# Patient Record
Sex: MALE | Race: WHITE | NOT HISPANIC OR LATINO | Employment: FULL TIME | URBAN - METROPOLITAN AREA
[De-identification: names, ages, dates, MRNs, and addresses within clinical notes are randomized per-mention and may not be internally consistent; named-entity substitution may affect disease eponyms.]

---

## 2017-06-30 ENCOUNTER — HOSPITAL ENCOUNTER (OUTPATIENT)
Dept: LAB | Facility: MEDICAL CENTER | Age: 42
End: 2017-06-30
Attending: FAMILY MEDICINE
Payer: COMMERCIAL

## 2017-06-30 LAB
ALBUMIN SERPL BCP-MCNC: 4.1 G/DL (ref 3.2–4.9)
ALBUMIN/GLOB SERPL: 1.4 G/DL
ALP SERPL-CCNC: 50 U/L (ref 30–99)
ALT SERPL-CCNC: 52 U/L (ref 2–50)
ANION GAP SERPL CALC-SCNC: 8 MMOL/L (ref 0–11.9)
AST SERPL-CCNC: 34 U/L (ref 12–45)
BASOPHILS # BLD AUTO: 0.5 % (ref 0–1.8)
BASOPHILS # BLD: 0.03 K/UL (ref 0–0.12)
BILIRUB SERPL-MCNC: 0.8 MG/DL (ref 0.1–1.5)
BUN SERPL-MCNC: 15 MG/DL (ref 8–22)
CALCIUM SERPL-MCNC: 9.1 MG/DL (ref 8.5–10.5)
CHLORIDE SERPL-SCNC: 108 MMOL/L (ref 96–112)
CHOLEST SERPL-MCNC: 232 MG/DL (ref 100–199)
CO2 SERPL-SCNC: 24 MMOL/L (ref 20–33)
CREAT SERPL-MCNC: 1.1 MG/DL (ref 0.5–1.4)
EOSINOPHIL # BLD AUTO: 0.15 K/UL (ref 0–0.51)
EOSINOPHIL NFR BLD: 2.5 % (ref 0–6.9)
ERYTHROCYTE [DISTWIDTH] IN BLOOD BY AUTOMATED COUNT: 41.3 FL (ref 35.9–50)
GFR SERPL CREATININE-BSD FRML MDRD: >60 ML/MIN/1.73 M 2
GLOBULIN SER CALC-MCNC: 3 G/DL (ref 1.9–3.5)
GLUCOSE SERPL-MCNC: 90 MG/DL (ref 65–99)
HCT VFR BLD AUTO: 51.4 % (ref 42–52)
HDLC SERPL-MCNC: 55 MG/DL
HGB BLD-MCNC: 17.4 G/DL (ref 14–18)
IMM GRANULOCYTES # BLD AUTO: 0.01 K/UL (ref 0–0.11)
IMM GRANULOCYTES NFR BLD AUTO: 0.2 % (ref 0–0.9)
LDLC SERPL CALC-MCNC: 144 MG/DL
LYMPHOCYTES # BLD AUTO: 1.71 K/UL (ref 1–4.8)
LYMPHOCYTES NFR BLD: 28.4 % (ref 22–41)
MCH RBC QN AUTO: 30.6 PG (ref 27–33)
MCHC RBC AUTO-ENTMCNC: 33.9 G/DL (ref 33.7–35.3)
MCV RBC AUTO: 90.5 FL (ref 81.4–97.8)
MONOCYTES # BLD AUTO: 0.66 K/UL (ref 0–0.85)
MONOCYTES NFR BLD AUTO: 11 % (ref 0–13.4)
NEUTROPHILS # BLD AUTO: 3.46 K/UL (ref 1.82–7.42)
NEUTROPHILS NFR BLD: 57.4 % (ref 44–72)
NRBC # BLD AUTO: 0 K/UL
NRBC BLD AUTO-RTO: 0 /100 WBC
PLATELET # BLD AUTO: 221 K/UL (ref 164–446)
PMV BLD AUTO: 11.6 FL (ref 9–12.9)
POTASSIUM SERPL-SCNC: 4 MMOL/L (ref 3.6–5.5)
PROT SERPL-MCNC: 7.1 G/DL (ref 6–8.2)
RBC # BLD AUTO: 5.68 M/UL (ref 4.7–6.1)
SODIUM SERPL-SCNC: 140 MMOL/L (ref 135–145)
TRIGL SERPL-MCNC: 166 MG/DL (ref 0–149)
TSH SERPL DL<=0.005 MIU/L-ACNC: 2.61 UIU/ML (ref 0.3–3.7)
WBC # BLD AUTO: 6 K/UL (ref 4.8–10.8)

## 2017-06-30 PROCEDURE — 80053 COMPREHEN METABOLIC PANEL: CPT

## 2017-06-30 PROCEDURE — 84443 ASSAY THYROID STIM HORMONE: CPT

## 2017-06-30 PROCEDURE — 80061 LIPID PANEL: CPT

## 2017-06-30 PROCEDURE — 36415 COLL VENOUS BLD VENIPUNCTURE: CPT

## 2017-06-30 PROCEDURE — 85025 COMPLETE CBC W/AUTO DIFF WBC: CPT

## 2017-07-10 ENCOUNTER — OCCUPATIONAL MEDICINE (OUTPATIENT)
Dept: URGENT CARE | Facility: PHYSICIAN GROUP | Age: 42
End: 2017-07-10
Payer: COMMERCIAL

## 2017-07-10 VITALS
RESPIRATION RATE: 16 BRPM | HEIGHT: 75 IN | SYSTOLIC BLOOD PRESSURE: 118 MMHG | BODY MASS INDEX: 32.95 KG/M2 | OXYGEN SATURATION: 96 % | HEART RATE: 82 BPM | TEMPERATURE: 97.3 F | WEIGHT: 265 LBS | DIASTOLIC BLOOD PRESSURE: 86 MMHG

## 2017-07-10 DIAGNOSIS — S71.111A LACERATION OF RIGHT THIGH, INITIAL ENCOUNTER: Primary | ICD-10-CM

## 2017-07-10 DIAGNOSIS — Z02.1 PRE-EMPLOYMENT DRUG SCREENING: ICD-10-CM
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
## 2017-07-10 LAB
AMP AMPHETAMINE: NORMAL
BREATH ALCOHOL COMMENT: NORMAL
COC COCAINE: NORMAL
INT CON NEG: NEGATIVE
INT CON POS: POSITIVE
MET METHAMPHETAMINES: NORMAL
OPI OPIATES: NORMAL
PCP PHENCYCLIDINE: NORMAL
POC BREATHALIZER: 0 PERCENT (ref 0–0.01)
POC DRUG COMMENT 753798-OCCUPATIONAL HEALTH: NORMAL
THC: NORMAL

## 2017-07-10 PROCEDURE — 12002 RPR S/N/AX/GEN/TRNK2.6-7.5CM: CPT | Mod: 29 | Performed by: PHYSICIAN ASSISTANT

## 2017-07-10 PROCEDURE — 80305 DRUG TEST PRSMV DIR OPT OBS: CPT | Mod: 29 | Performed by: PHYSICIAN ASSISTANT

## 2017-07-10 PROCEDURE — 90471 IMMUNIZATION ADMIN: CPT | Mod: 29 | Performed by: PHYSICIAN ASSISTANT

## 2017-07-10 PROCEDURE — 90715 TDAP VACCINE 7 YRS/> IM: CPT | Mod: 29 | Performed by: PHYSICIAN ASSISTANT

## 2017-07-10 PROCEDURE — 82075 ASSAY OF BREATH ETHANOL: CPT | Performed by: PHYSICIAN ASSISTANT

## 2017-07-10 RX ORDER — TRAMADOL HYDROCHLORIDE 50 MG/1
50 TABLET ORAL EVERY 4 HOURS PRN
Qty: 30 TAB | Refills: 0 | Status: SHIPPED | OUTPATIENT
Start: 2017-07-10 | End: 2018-02-23

## 2017-07-10 RX ORDER — CEPHALEXIN 500 MG/1
500 CAPSULE ORAL 4 TIMES DAILY
Qty: 20 CAP | Refills: 0 | Status: SHIPPED | OUTPATIENT
Start: 2017-07-10 | End: 2017-07-15

## 2017-07-10 ASSESSMENT — PAIN SCALES - GENERAL: PAINLEVEL: NO PAIN

## 2017-07-10 NOTE — Clinical Note
[facility]   [address] - [place] [address]  Phone: [phone] - Fax: [phone]        [facility] Progress Report and Disability Certification  Date of Service: 7/10/2017   No Show:  No  Date / Time of Next Visit: 7/20/2017   Claim Information   Patient Name: Deejay Pham  Claim Number:     Employer: [employer]  Date of Injury:   07/10/2017   Insurer / TPA: [employer] Claims ID / SSN:     Occupation:   II Diagnosis: The primary encounter diagnosis was Laceration of right thigh, initial encounter. A diagnosis of Work related injury was also pertinent to this visit.    Medical Information   Related to Industrial Injury? Yes    Subjective Complaints:  DOI: 7/10/17  Pt notes he was cutting zip ties at work with a dirty utility knife and it slipped and cut into his right thigh. Pt notes tetanus is out of date. Pt denies second job. Pt notes only minor blood loss and wound dressed at work. Pt has not taken any Rx medications for this condition. Pt states the pain is a 7/10, aching in nature and worse right now. Pt denies CP, SOB, NVD, paresthesias, headaches, dizziness, change in vision, hives, or other joint pain. The pt's medication list, problem list, and allergies have been evaluated and reviewed during today's visit.     Objective Findings: Right thigh: Upon inspection, no ecchymoses, no edema, no erythema. +TTP about 3 cm laceration with mild sanguinous drainage, +AROM, +SILT, STR 5/5, DP 2+ B/L      Pre-Existing Condition(s):     Assessment:   Initial Visit    Status: Additional Care Required  Permanent Disability:No    Plan: MedicationMedication (NOT at Work)  Comments:OTC ibuprofen and RX keflex and RX ultram (not at work)    Diagnostics:   Comments:n/a    Comments:       Disability Information   Status: Released to Restricted Duty    From:  7/10/2017  Through: 7/20/2017 Restrictions  are: Temporary   Physical Restrictions   Sitting:    Standing:    Stooping:    Bending:      Squattin hrs/day Walking:    Climbin hrs/day Pushing:      Pulling:    Other:    Reaching Above Shoulder (L):   Reaching Above Shoulder (R):       Reaching Below Shoulder (L):    Reaching Below Shoulder (R):      Not to exceed Weight Limits   Carrying(hrs):   Weight Limit(lb): < or = to 25 pounds Lifting(hrs):   Weight  Limit(lb): < or = to 25 pounds   Comments:      Repetitive Actions   Hands: i.e. Fine Manipulations from Grasping:     Feet: i.e. Operating Foot Controls:     Driving / Operate Machinery:     Physician Name: Luis Lima PA-C Physician Signature: LUIS Borges PA-C e-Signature: Dr. Bertram Bahena, Medical Director   Clinic Name / Location: [address]  [place] [address] Clinic Phone Number: Dept: [phone]   Appointment Time: 12:55 Pm Visit Start Time: 1:24 PM   Check-In Time:  12:59 Pm Visit Discharge Time:  2:20PM   Original-Treating Physician or Chiropractor    Page 2-Insurer/TPA    Page 3-Employer    Page 4-Employee

## 2017-07-10 NOTE — PATIENT INSTRUCTIONS
Laceration Care, Adult  A laceration is a cut that goes through all of the layers of the skin and into the tissue that is right under the skin. Some lacerations heal on their own. Others need to be closed with stitches (sutures), staples, skin adhesive strips, or skin glue. Proper laceration care minimizes the risk of infection and helps the laceration to heal better.  HOW TO CARE FOR YOUR LACERATION  If sutures or staples were used:  · Keep the wound clean and dry.  · If you were given a bandage (dressing), you should change it at least one time per day or as told by your health care provider. You should also change it if it becomes wet or dirty.  · Keep the wound completely dry for the first 24 hours or as told by your health care provider. After that time, you may shower or bathe. However, make sure that the wound is not soaked in water until after the sutures or staples have been removed.  · Clean the wound one time each day or as told by your health care provider:  ¨ Wash the wound with soap and water.  ¨ Rinse the wound with water to remove all soap.  ¨ Pat the wound dry with a clean towel. Do not rub the wound.  · After cleaning the wound, apply a thin layer of antibiotic ointment as told by your health care provider. This will help to prevent infection and keep the dressing from sticking to the wound.  · Have the sutures or staples removed as told by your health care provider.  If skin adhesive strips were used:  · Keep the wound clean and dry.  · If you were given a bandage (dressing), you should change it at least one time per day or as told by your health care provider. You should also change it if it becomes dirty or wet.  · Do not get the skin adhesive strips wet. You may shower or bathe, but be careful to keep the wound dry.  · If the wound gets wet, pat it dry with a clean towel. Do not rub the wound.  · Skin adhesive strips fall off on their own. You may trim the strips as the wound heals. Do not  remove skin adhesive strips that are still stuck to the wound. They will fall off in time.  If skin glue was used:  · Try to keep the wound dry, but you may briefly wet it in the shower or bath. Do not soak the wound in water, such as by swimming.  · After you have showered or bathed, gently pat the wound dry with a clean towel. Do not rub the wound.  · Do not do any activities that will make you sweat heavily until the skin glue has fallen off on its own.  · Do not apply liquid, cream, or ointment medicine to the wound while the skin glue is in place. Using those may loosen the film before the wound has healed.  · If you were given a bandage (dressing), you should change it at least one time per day or as told by your health care provider. You should also change it if it becomes dirty or wet.  · If a dressing is placed over the wound, be careful not to apply tape directly over the skin glue. Doing that may cause the glue to be pulled off before the wound has healed.  · Do not pick at the glue. The skin glue usually remains in place for 5-10 days, then it falls off of the skin.  General Instructions  · Take over-the-counter and prescription medicines only as told by your health care provider.  · If you were prescribed an antibiotic medicine or ointment, take or apply it as told by your doctor. Do not stop using it even if your condition improves.  · To help prevent scarring, make sure to cover your wound with sunscreen whenever you are outside after stitches are removed, after adhesive strips are removed, or when glue remains in place and the wound is healed. Make sure to wear a sunscreen of at least 30 SPF.  · Do not scratch or pick at the wound.  · Keep all follow-up visits as told by your health care provider. This is important.  · Check your wound every day for signs of infection. Watch for:  ¨ Redness, swelling, or pain.  ¨ Fluid, blood, or pus.  · Raise (elevate) the injured area above the level of your heart  while you are sitting or lying down, if possible.  SEEK MEDICAL CARE IF:  · You received a tetanus shot and you have swelling, severe pain, redness, or bleeding at the injection site.  · You have a fever.  · A wound that was closed breaks open.  · You notice a bad smell coming from your wound or your dressing.  · You notice something coming out of the wound, such as wood or glass.  · Your pain is not controlled with medicine.  · You have increased redness, swelling, or pain at the site of your wound.  · You have fluid, blood, or pus coming from your wound.  · You notice a change in the color of your skin near your wound.  · You need to change the dressing frequently due to fluid, blood, or pus draining from the wound.  · You develop a new rash.  · You develop numbness around the wound.  SEEK IMMEDIATE MEDICAL CARE IF:  · You develop severe swelling around the wound.  · Your pain suddenly increases and is severe.  · You develop painful lumps near the wound or on skin that is anywhere on your body.  · You have a red streak going away from your wound.  · The wound is on your hand or foot and you cannot properly move a finger or toe.  · The wound is on your hand or foot and you notice that your fingers or toes look pale or bluish.     This information is not intended to replace advice given to you by your health care provider. Make sure you discuss any questions you have with your health care provider.     Document Released: 12/18/2006 Document Revised: 05/03/2016 Document Reviewed: 12/14/2015  That{img} Interactive Patient Education ©2016 [employer]

## 2017-07-10 NOTE — PROGRESS NOTES
Subjective:      Pt is a 41 y.o. male who presents with Leg Laceration      DOI: 7/10/17  Pt notes he was cutting zip ties at work with a dirty utility knife and it slipped and cut into his right thigh. Pt notes tetanus is out of date. Pt denies second job. Pt notes only minor blood loss and wound dressed at work. Pt has not taken any Rx medications for this condition. Pt states the pain is a 7/10, aching in nature and worse right now. Pt denies CP, SOB, NVD, paresthesias, headaches, dizziness, change in vision, hives, or other joint pain. The pt's medication list, problem list, and allergies have been evaluated and reviewed during today's visit.       HPI  PMH:  Past Medical History   Diagnosis Date   • Obesity, unspecified    • Hypertension        PSH:  Past Surgical History   Procedure Laterality Date   • Appendectomy     • Fasciotomy Right 2016     Procedure: FASCIOTOMY FOOT;  Surgeon: Spencer Brandt D.P.M.;  Location: SURGERY SAME DAY [facility];  Service:        [facility] Hx:    family history includes Cancer in his maternal grandfather, maternal grandmother, and mother; Hyperlipidemia in his father; Hypertension in his father.  Family Status   Relation Status Death Age   • Mother     • Father Alive    • Sister Alive    • Brother Alive    • Maternal Grandmother     • Maternal Grandfather     • Paternal Grandmother     • Paternal Grandfather         Soc HX:  Social History     Social History   • Marital Status:      Spouse Name: N/A   • Number of Children: N/A   • Years of Education: N/A     Occupational History   • Not on file.     Social History Main Topics   • Smoking status: Never Smoker    • Smokeless tobacco: Current User     Types: Chew   • Alcohol Use: 3.0 oz/week     6 Cans of beer per week      Comment: 7/week   • Drug Use: No   • Sexual Activity:     Partners: Female     Other Topics Concern   • Not on file     Social History Narrative  "        Medications:    Current outpatient prescriptions:   •  tramadol (ULTRAM) 50 MG Tab, Take 1 Tab by mouth every four hours as needed., Disp: 30 Tab, Rfl: 0  •  cephALEXin (KEFLEX) 500 MG Cap, Take 1 Cap by mouth 4 times a day for 5 days., Disp: 20 Cap, Rfl: 0  •  lisinopril (PRINIVIL) 20 MG Tab, Take 20 mg by mouth every day., Disp: , Rfl:   •  vitamin D (CHOLECALCIFEROL) 1000 UNIT Tab, Take 1,000 Units by mouth every day., Disp: , Rfl:       Allergies:  Review of patient's allergies indicates no known allergies.      ROS  Constitutional: Negative for fever, chills and malaise/fatigue.   HENT: Negative for congestion and sore throat.    Eyes: Negative for blurred vision, double vision and photophobia.   Respiratory: Negative for cough and shortness of breath.    Cardiovascular: Negative for chest pain and palpitations.   Gastrointestinal: Negative for heartburn, nausea, vomiting, abdominal pain, diarrhea and constipation.   Genitourinary: Negative for dysuria and flank pain.   Musculoskeletal: Negative for joint pain and myalgias.   Skin: POS for right thigh laceration  Neurological: Negative for dizziness, tingling and headaches.   Endo/Heme/Allergies: Does not bruise/bleed easily.   Psychiatric/Behavioral: Negative for depression. The patient is not nervous/anxious.           Objective:     /86 mmHg  Pulse 82  Temp(Src) 36.3 °C (97.3 °F)  Resp 16  Ht 1.905 m (6' 3\")  Wt 120.203 kg (265 lb)  BMI 33.12 kg/m2  SpO2 96%     Physical Exam    Right thigh: Upon inspection, no ecchymoses, no edema, no erythema. +TTP about 3 cm laceration with mild sanguinous drainage, +AROM, +SILT, STR 5/5, DP 2+ B/L     Constitutional: PT is oriented to person, place, and time. PT appears well-developed and well-nourished. No distress.   HENT:   Head: Normocephalic and atraumatic.   Mouth/Throat: Oropharynx is clear and moist. No oropharyngeal exudate.   Eyes: Conjunctivae normal and EOM are normal. Pupils are equal, " round, and reactive to light.   Neck: Normal range of motion. Neck supple. No thyromegaly present.   Cardiovascular: Normal rate, regular rhythm, normal heart sounds and intact distal pulses.  Exam reveals no gallop and no friction rub.    No murmur heard.  Pulmonary/Chest: Effort normal and breath sounds normal. No respiratory distress. PT has no wheezes. PT has no rales. Pt exhibits no tenderness.   Abdominal: Soft. Bowel sounds are normal. PT exhibits no distension and no mass. There is no tenderness. There is no rebound and no guarding.   Musculoskeletal: Normal range of motion. PT exhibits no edema and no tenderness.   Neurological: PT is alert and oriented to person, place, and time. PT has normal reflexes. No cranial nerve deficit.       Psychiatric: PT has a normal mood and affect. PT behavior is normal. Judgment and thought content normal.        Assessment/Plan:     1. Laceration of right thigh, initial encounter    - TDAP VACCINE =>6YO IM  - tramadol (ULTRAM) 50 MG Tab; Take 1 Tab by mouth every four hours as needed.  Dispense: 30 Tab; Refill: 0  - cephALEXin (KEFLEX) 500 MG Cap; Take 1 Cap by mouth 4 times a day for 5 days.  Dispense: 20 Cap; Refill: 0    2. Work related injury    - TDAP VACCINE =>6YO IM      Procedure: Laceration Repair  -Risks including bleeding, nerve damage, infection, and poor cosmetic outcome discussed at length. Benefits and alternatives discussed.   -Sterile technique throughout  -Local anesthesia with 2% lidocaine plain  -Closed with #3  4-0 Nylon interrupted sutures with good wound approximation  -Polysporin and dressing placed  -Patient tolerated well    Tetanus updated in clinic today  [place]  Aware web site evaluation: I have obtained and reviewed patient utilization report from [facility] pharmacy database prior to writing prescription for controlled substance II, III or IV per [place] bill . Based on the report and my clinical assessment the prescription is  medically necessary.   NSAIDs for pain 1-5, Ultram for pain 6-10 or to help get to sleep.  RICE therapy discussed  Gentle ROM exercises discussed  WBAT right LE  Ice/heat therapy discussed  OTC ibuprofen and RX ultram for pain control  Keflex for abx coverage  Rest, fluids encouraged.  AVS with medical info given.  Pt was in full understanding and agreement with the plan.  Follow-up 10 days for suture removal and wound check

## 2017-07-10 NOTE — Clinical Note
"EMPLOYEE’S CLAIM FOR COMPENSATION/ REPORT OF INITIAL TREATMENT  FORM C-4    EMPLOYEE’S CLAIM - PROVIDE ALL INFORMATION REQUESTED   First Name  Deejay Last Name  Vero Birthdate                    1975                Sex  male Claim Number   Home Address  [address] Age  41 y.o. 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 (if applicable)      How did this injury or occupational disease occur? (Be specific an answer in detail. Use additional sheet if necessary)     If you believe that you have an occupational disease, when did you first have knowledge of the disability and it relationship to your employment?   Witnesses to the Accident        Nature of Injury or Occupational Disease    Part(s) of Body Injured or Affected  , ,     I certify that the above is true and correct to the best of my knowledge and that I have provided this information in order to obtain the benefits of [place]  I hereby authorize any physician, " chiropractor, surgeon, practitioner, or other person, any hospital, including [facility] or [facility], any medical service organization, any insurance company, or other institution or organization to release to each other, any medical or other information, including benefits paid or payable, pertinent to this injury or disease, except information relative to diagnosis, treatment and/or counseling for AIDS, psychological conditions, alcohol or controlled substances, for which I must give specific authorization.  A Photostat of this authorization shall be as valid as the original.     Date   Place   Employee’s Signature   THIS REPORT MUST BE COMPLETED AND MAILED WITHIN 3 WORKING DAYS OF TREATMENT   Place  [facility]  Name of Facility  [place]   Date  7/10/2017 Diagnosis  (S71.111A) Laceration of right thigh, initial encounter  (primary encounter diagnosis)  (Y99.0) Work related injury Is there evidence the injured employee was under the influence of alcohol and/or another controlled substance at the time of accident?   Hour  1:24 PM Description of Injury or Disease  The primary encounter diagnosis was Laceration of right thigh, initial encounter. A diagnosis of Work related injury was also pertinent to this visit. No   Treatment  Rest, ice, wound cleansed and sutured with 3 simple interrupted sutures, OTC ibuprofen and RX ultram for pain with Keflex antibiotic coverage from using unclean knife which caused the injury.  Have you advised the patient to remain off work five days or more? No   X-Ray Findings    Comments:n/a   If Yes   From Date  To Date      From information given by the employee, together with medical evidence, can you directly connect this injury or occupational disease as job incurred?  Yes If No Full Duty  No Modified Duty  Yes   Is additional medical care by a physician indicated?  Yes If Modified Duty, Specify any Limitations / Restrictions  SEE  "RESTRICTIONS   Do you know of any previous injury or disease contributing to this condition or occupational disease?                            No   Date  7/10/2017 Print Doctor’s Name Luis Lima PA-C I certify the employer’s copy of  this form was mailed on:   Address  [address] [place] [address]. [address] Insurer’s Use Only     [facility] Zip  [address]    Provider’s Tax ID Number  [id number]  Telephone  Dept: [phone]        e-SignJOSELUIS VILLA PA-C   e-Signature: Dr. Bertram Bahena, Medical Director Degree  SANJUANITA        ORIGINAL-TREATING PHYSICIAN OR CHIROPRACTOR    PAGE 2-INSURER/TPA    PAGE 3-EMPLOYER    PAGE 4-EMPLOYEE             Form C-4 (rev10/07)              BRIEF DESCRIPTION OF RIGHTS AND BENEFITS  (Pursuant to [place] 616[place])    Notice of Injury or Occupational Disease (Incident Report Form C-1): If an injury or occupational disease (OD) arises out of and in the  course of employment, you must provide written notice to your employer as soon as practicable, but no later than 7 days after the accident or  OD. Your employer shall maintain a sufficient supply of the required forms.    Claim for Compensation (Form C-4): If medical treatment is sought, the form C-4 is available at the place of initial treatment. A completed  \"Claim for Compensation\" (Form C-4) must be filed within 90 days after an accident or OD. The treating physician or chiropractor must,  within 3 working days after treatment, complete and mail to the employer, the employer's insurer and third-party , the Claim for  Compensation.    Medical Treatment: If you require medical treatment for your on-the-job injury or OD, you may be required to select a physician or  chiropractor from a list provided by your workers’ compensation insurer, if it has contracted with an Organization for Managed Care (MCO) or  Preferred Provider Organization (PPO) or providers of health care. If your employer has not entered into a " contract with an MCO or PPO, you  may select a physician or chiropractor from the Panel of Physicians and Chiropractors. Any medical costs related to your industrial injury or  OD will be paid by your insurer.    Temporary Total Disability (TTD): If your doctor has certified that you are unable to work for a period of at least 5 consecutive days, or 5  cumulative days in a 20-day period, or places restrictions on you that your employer does not accommodate, you may be entitled to TTD  compensation.    Temporary Partial Disability (TPD): If the wage you receive upon reemployment is less than the compensation for TTD to which you are  entitled, the insurer may be required to pay you TPD compensation to make up the difference. TPD can only be paid for a maximum of 24  months.    Permanent Partial Disability (PPD): When your medical condition is stable and there is an indication of a PPD as a result of your injury or  OD, within 30 days, your insurer must arrange for an evaluation by a rating physician or chiropractor to determine the degree of your PPD. The  amount of your PPD award depends on the date of injury, the results of the PPD evaluation and your age and wage.    Permanent Total Disability (PTD): If you are medically certified by a treating physician or chiropractor as permanently and totally disabled  and have been granted a PTD status by your insurer, you are entitled to receive monthly benefits not to exceed 66 2/3% of your average  monthly wage. The amount of your PTD payments is subject to reduction if you previously received a PPD award.    Vocational Rehabilitation Services: You may be eligible for vocational rehabilitation services if you are unable to return to the job due to a  permanent physical impairment or permanent restrictions as a result of your injury or occupational disease.    Transportation and Per Alexei Reimbursement: You may be eligible for travel expenses and per alexei associated with  medical treatment.    Reopening: You may be able to reopen your claim if your condition worsens after claim closure.    Appeal Process: If you disagree with a written determination issued by the insurer or the insurer does not respond to your request, you may  appeal to the Department of Administration, , by following the instructions contained in your determination letter. You must  appeal the determination within 70 days from the date of the determination letter at [address], [address], [place], [place]  [address], or [address], [place], [place] [address]. If you disagree with the  decision, you may appeal to the  Department of Administration, . You must file your appeal within 30 days from the date of the  decision  letter at [address], [address], [place], [place] [address], or [address], [place], [place] [address]. If you  disagree with a decision of an , you may file a petition for judicial review with the District Court. You must do so within 30  days of the Appeal Officer’s decision. You may be represented by an  at your own expense or you may contact the [facility] for possible  representation.    [place]  for Injured Workers (NAIW): If you disagree with a  decision, you may request that NAIW represent you  without charge at an  Hearing. For information regarding denial of benefits, you may contact the [facility] at: [address], [address], [place] [address], [phone], or [address], [address], [place] [address], [phone]    To File a Complaint with the Division: If you wish to file a complaint with the  of the Division of Industrial Relations (DIR),  please contact the Workers’ Compensation Section, [address], [address], [place], [place] [address], telephone [phone]) [phone],  or  [address], [address], [place], [place] [address], telephone [phone].    For assistance with Workers’ Compensation Issues: you may contact the Office of the Governor Consumer Health Assistance, [address] [place] [address], [address], [place], [place] [address], Toll Free 1-883.245.2069, Web site: [email or web address].[email or web address]., E-mail  [email or web address].[email or web address].                                                                                                                                                                                                                                   __________________________________________________________________                                                                   _________________                Employee Name / Signature                                                                                                                                                       Date                                                                                                                                                                                                     D-2 (rev. 10/07)

## 2017-07-10 NOTE — MR AVS SNAPSHOT
"        Deejay Yoder Jorgezaycarolee   7/10/2017 12:55 PM   Occupational Medicine   MRN: [id number]    Department:  [facility]   Dept Phone:  [phone]    Description:  Male : 1975   Provider:  Quinton Lima PA-C           Reason for Visit     Leg Laceration WC IV 07/10/17 Leg Laceration. Pt cut leg with knife. Unsure of last tetanus.      Allergies as of 7/10/2017     No Known Allergies      You were diagnosed with     Laceration of right thigh, initial encounter   [724661]  -  Primary     Work related injury   [853097]         Vital Signs     Blood Pressure Pulse Temperature Respirations Height Weight    118/86 mmHg 82 36.3 °C (97.3 °F) 16 1.905 m (6' 3\") 120.203 kg (265 lb)    Body Mass Index Oxygen Saturation Smoking Status             33.12 kg/m2 96% Never Smoker          Basic Information     Date Of Birth Sex Race Ethnicity Preferred Language    1975 Male White Non- English      Your appointments     2017  3:00 PM   Workers Compensation with [facility])    [address]  [place] [address]   [phone]              Problem List              ICD-10-CM Priority Class Noted - Resolved    Dyslipidemia E78.5   2010 - Present    Epicondylitis, lateral M77.10   2011 - Present    ED (erectile dysfunction) N52.9   2011 - Present    Chronic back pain M54.9, G89.29   2011 - Present    Right foot pain M79.671   2016 - Present      Health Maintenance        Date Due Completion Dates    IMM DTaP/Tdap/Td Vaccine (1 - Tdap) 10/22/1993 ---    IMM INFLUENZA (1) 2017 ---            Current Immunizations     Tdap Vaccine  Incomplete      Below and/or attached are the medications your provider expects you to take. Review all of your home medications and newly ordered medications with your provider and/or pharmacist. Follow medication instructions as directed by your provider and/or pharmacist. " Please keep your medication list with you and share with your provider. Update the information when medications are discontinued, doses are changed, or new medications (including over-the-counter products) are added; and carry medication information at all times in the event of emergency situations     Allergies:  No Known Allergies          Medications  Valid as of: July 10, 2017 -  2:24 PM    Generic Name Brand Name Tablet Size Instructions for use    Cephalexin (Cap) KEFLEX 500 MG Take 1 Cap by mouth 4 times a day for 5 days.        Cholecalciferol (Tab) cholecalciferol 1000 UNIT Take 1,000 Units by mouth every day.        Lisinopril (Tab) PRINIVIL 20 MG Take 20 mg by mouth every day.        TraMADol HCl (Tab) ULTRAM 50 MG Take 1 Tab by mouth every four hours as needed.        .                 Medicines prescribed today were sent to:     [facility]Y #[id number] - [place], [place] - [address]    [address] [facility]. [place] [address]    Phone: [phone] Fax: [phone]    Open 24 Hours?: No      Medication refill instructions:       If your prescription bottle indicates you have medication refills left, it is not necessary to call your provider’s office. Please contact your pharmacy and they will refill your medication.    If your prescription bottle indicates you do not have any refills left, you may request refills at any time through one of the following ways: The online [employer] system (except Urgent Care), by calling your provider’s office, or by asking your pharmacy to contact your provider’s office with a refill request. Medication refills are processed only during regular business hours and may not be available until the next business day. Your provider may request additional information or to have a follow-up visit with you prior to refilling your medication.   *Please Note: Medication refills are assigned a new Rx number when refilled electronically. Your pharmacy may indicate that no refills were  authorized even though a new prescription for the same medication is available at the pharmacy. Please request the medicine by name with the pharmacy before contacting your provider for a refill.        Instructions    Laceration Care, Adult  A laceration is a cut that goes through all of the layers of the skin and into the tissue that is right under the skin. Some lacerations heal on their own. Others need to be closed with stitches (sutures), staples, skin adhesive strips, or skin glue. Proper laceration care minimizes the risk of infection and helps the laceration to heal better.  HOW TO CARE FOR YOUR LACERATION  If sutures or staples were used:  · Keep the wound clean and dry.  · If you were given a bandage (dressing), you should change it at least one time per day or as told by your health care provider. You should also change it if it becomes wet or dirty.  · Keep the wound completely dry for the first 24 hours or as told by your health care provider. After that time, you may shower or bathe. However, make sure that the wound is not soaked in water until after the sutures or staples have been removed.  · Clean the wound one time each day or as told by your health care provider:  ¨ Wash the wound with soap and water.  ¨ Rinse the wound with water to remove all soap.  ¨ Pat the wound dry with a clean towel. Do not rub the wound.  · After cleaning the wound, apply a thin layer of antibiotic ointment as told by your health care provider. This will help to prevent infection and keep the dressing from sticking to the wound.  · Have the sutures or staples removed as told by your health care provider.  If skin adhesive strips were used:  · Keep the wound clean and dry.  · If you were given a bandage (dressing), you should change it at least one time per day or as told by your health care provider. You should also change it if it becomes dirty or wet.  · Do not get the skin adhesive strips wet. You may shower or bathe,  but be careful to keep the wound dry.  · If the wound gets wet, pat it dry with a clean towel. Do not rub the wound.  · Skin adhesive strips fall off on their own. You may trim the strips as the wound heals. Do not remove skin adhesive strips that are still stuck to the wound. They will fall off in time.  If skin glue was used:  · Try to keep the wound dry, but you may briefly wet it in the shower or bath. Do not soak the wound in water, such as by swimming.  · After you have showered or bathed, gently pat the wound dry with a clean towel. Do not rub the wound.  · Do not do any activities that will make you sweat heavily until the skin glue has fallen off on its own.  · Do not apply liquid, cream, or ointment medicine to the wound while the skin glue is in place. Using those may loosen the film before the wound has healed.  · If you were given a bandage (dressing), you should change it at least one time per day or as told by your health care provider. You should also change it if it becomes dirty or wet.  · If a dressing is placed over the wound, be careful not to apply tape directly over the skin glue. Doing that may cause the glue to be pulled off before the wound has healed.  · Do not pick at the glue. The skin glue usually remains in place for 5-10 days, then it falls off of the skin.  General Instructions  · Take over-the-counter and prescription medicines only as told by your health care provider.  · If you were prescribed an antibiotic medicine or ointment, take or apply it as told by your doctor. Do not stop using it even if your condition improves.  · To help prevent scarring, make sure to cover your wound with sunscreen whenever you are outside after stitches are removed, after adhesive strips are removed, or when glue remains in place and the wound is healed. Make sure to wear a sunscreen of at least 30 SPF.  · Do not scratch or pick at the wound.  · Keep all follow-up visits as told by your health care  provider. This is important.  · Check your wound every day for signs of infection. Watch for:  ¨ Redness, swelling, or pain.  ¨ Fluid, blood, or pus.  · Raise (elevate) the injured area above the level of your heart while you are sitting or lying down, if possible.  SEEK MEDICAL CARE IF:  · You received a tetanus shot and you have swelling, severe pain, redness, or bleeding at the injection site.  · You have a fever.  · A wound that was closed breaks open.  · You notice a bad smell coming from your wound or your dressing.  · You notice something coming out of the wound, such as wood or glass.  · Your pain is not controlled with medicine.  · You have increased redness, swelling, or pain at the site of your wound.  · You have fluid, blood, or pus coming from your wound.  · You notice a change in the color of your skin near your wound.  · You need to change the dressing frequently due to fluid, blood, or pus draining from the wound.  · You develop a new rash.  · You develop numbness around the wound.  SEEK IMMEDIATE MEDICAL CARE IF:  · You develop severe swelling around the wound.  · Your pain suddenly increases and is severe.  · You develop painful lumps near the wound or on skin that is anywhere on your body.  · You have a red streak going away from your wound.  · The wound is on your hand or foot and you cannot properly move a finger or toe.  · The wound is on your hand or foot and you notice that your fingers or toes look pale or bluish.     This information is not intended to replace advice given to you by your health care provider. Make sure you discuss any questions you have with your health care provider.     Document Released: 12/18/2006 Document Revised: 05/03/2016 Document Reviewed: 12/14/2015  [employer] Interactive Patient Education ©2016 Elsevier Inc.            Goji Access Code: [email or web address]  Expires: 8/9/2017  1:48 PM    Goji  A secure, online tool to manage your health information     [facility]’s MyChart® is a secure, online tool that connects you to your personalized health information from the privacy of your home -- day or night - making it very easy for you to manage your healthcare. Once the activation process is completed, you can even access your medical information using the TXCOM ger, which is available for free in the Apple Ger store or Google Play store.     TXCOM provides the following levels of access (as shown below):   My Chart Features   [facility] Doctor [facility]  Specialists [facility]  Urgent  Care Non-[facility]  Primary Care  Doctor   Email your healthcare team securely and privately 24/7 X X X    Manage appointments: schedule your next appointment; view details of past/upcoming appointments X      Request prescription refills. X      View recent personal medical records, including lab and immunizations X X X X   View health record, including health history, allergies, medications X X X X   Read reports about your outpatient visits, procedures, consult and ER notes X X X X   See your discharge summary, which is a recap of your hospital and/or ER visit that includes your diagnosis, lab results, and care plan. X X       How to register for TXCOM:  1. Go to  [email or web address].  2. Click on the Sign Up Now box, which takes you to the New Member Sign Up page. You will need to provide the following information:  a. Enter your TXCOM Access Code exactly as it appears at the top of this page. (You will not need to use this code after you’ve completed the sign-up process. If you do not sign up before the expiration date, you must request a new code.)   b. Enter your date of birth.   c. Enter your home email address.   d. Click Submit, and follow the next screen’s instructions.  3. Create a TXCOM ID. This will be your TXCOM login ID and cannot be changed, so think of one that is secure and easy to remember.  4. Create a TXCOM password. You can change your password at  any time.  5. Enter your Password Reset Question and Answer. This can be used at a later time if you forget your password.   6. Enter your e-mail address. This allows you to receive e-mail notifications when new information is available in [employer].  7. Click Sign Up. You can now view your health information.    For assistance activating your [employer] account, call [phone]        Quit Tobacco Information     Do you want to quit using tobacco?    Quitting tobacco decreases risks of cancer, heart and lung disease, increases life expectancy, improves sense of taste and smell, and increases spending money, among other benefits.    If you are thinking about quitting, we can help.  • [facility] Quit Tobacco Program: 489.966.6002  o Program occurs weekly for four weeks and includes pharmacist consultation on products to support quitting smoking or chewing tobacco. A provider referral is needed for pharmacist consultation.  • Tobacco Users Help Hotline: 3-940-QUIT-NOW (020-8525) or https://[place].quitlogix.org/  o Free, confidential telephone and online coaching for [place] residents. Sessions are designed on a schedule that is convenient for you. Eligible clients receive free nicotine replacement therapy.  • Nationally: www.smokefree.gov  o Information and professional assistance to support both immediate and long-term needs as you become, and remain, a non-smoker. Smokefree.gov allows you to choose the help that best fits your needs.

## 2017-07-21 ENCOUNTER — OCCUPATIONAL MEDICINE (OUTPATIENT)
Dept: URGENT CARE | Facility: PHYSICIAN GROUP | Age: 42
End: 2017-07-21
Payer: COMMERCIAL

## 2017-07-21 VITALS
WEIGHT: 265 LBS | TEMPERATURE: 98.6 F | RESPIRATION RATE: 16 BRPM | BODY MASS INDEX: 32.95 KG/M2 | HEIGHT: 75 IN | HEART RATE: 74 BPM | SYSTOLIC BLOOD PRESSURE: 120 MMHG | OXYGEN SATURATION: 96 % | DIASTOLIC BLOOD PRESSURE: 80 MMHG

## 2017-07-21 DIAGNOSIS — S71.111D LACERATION OF RIGHT THIGH, SUBSEQUENT ENCOUNTER: ICD-10-CM

## 2017-07-21 DIAGNOSIS — Z48.02 VISIT FOR SUTURE REMOVAL: ICD-10-CM

## 2017-07-21 PROCEDURE — 99024 POSTOP FOLLOW-UP VISIT: CPT | Mod: 29 | Performed by: NURSE PRACTITIONER

## 2017-07-21 NOTE — MR AVS SNAPSHOT
"        Deejay Graysoncarolee   2017 3:00 PM   Occupational Medicine   MRN: [id number]    Department:  [facility] [place]   Dept Phone:  [phone]    Description:  Male : 1975   Provider:  DAVID Osorio           Reason for Visit     Follow-Up WC [facility]      Allergies as of 2017     No Known Allergies      You were diagnosed with     Laceration of right thigh, subsequent encounter   [465696]       Visit for suture removal   [758100]         Vital Signs     Blood Pressure Pulse Temperature Respirations Height Weight    120/80 mmHg 74 37 °C (98.6 °F) 16 1.905 m (6' 3\") 120.203 kg (265 lb)    Body Mass Index Oxygen Saturation Smoking Status             33.12 kg/m2 96% Never Smoker          Basic Information     Date Of Birth Sex Race Ethnicity Preferred Language    1975 Male White Non- English      Problem List              ICD-10-CM Priority Class Noted - Resolved    Dyslipidemia E78.5   2010 - Present    Epicondylitis, lateral M77.10   2011 - Present    ED (erectile dysfunction) N52.9   2011 - Present    Chronic back pain M54.9, G89.29   2011 - Present    Right foot pain M79.671   2016 - Present      Health Maintenance        Date Due Completion Dates    IMM INFLUENZA (1) 2017 ---    IMM DTaP/Tdap/Td Vaccine (2 - Td) 7/10/2027 7/10/2017            Current Immunizations     Tdap Vaccine 7/10/2017      Below and/or attached are the medications your provider expects you to take. Review all of your home medications and newly ordered medications with your provider and/or pharmacist. Follow medication instructions as directed by your provider and/or pharmacist. Please keep your medication list with you and share with your provider. Update the information when medications are discontinued, doses are changed, or new medications (including over-the-counter products) are added; and carry medication information at all times in the event of emergency " situations     Allergies:  No Known Allergies          Medications  Valid as of: July 21, 2017 -  3:54 PM    Generic Name Brand Name Tablet Size Instructions for use    Cholecalciferol (Tab) cholecalciferol 1000 UNIT Take 1,000 Units by mouth every day.        Lisinopril (Tab) PRINIVIL 20 MG Take 20 mg by mouth every day.        TraMADol HCl (Tab) ULTRAM 50 MG Take 1 Tab by mouth every four hours as needed.        .                 Medicines prescribed today were sent to:     [facility]/PHARMACY #[id number] - [place], [place] - [address] [address]    [address] [place] [address] [place] [address]    Phone: [phone] Fax: [phone]    Open 24 Hours?: No      Medication refill instructions:       If your prescription bottle indicates you have medication refills left, it is not necessary to call your provider’s office. Please contact your pharmacy and they will refill your medication.    If your prescription bottle indicates you do not have any refills left, you may request refills at any time through one of the following ways: The online [employer] system (except Urgent Care), by calling your provider’s office, or by asking your pharmacy to contact your provider’s office with a refill request. Medication refills are processed only during regular business hours and may not be available until the next business day. Your provider may request additional information or to have a follow-up visit with you prior to refilling your medication.   *Please Note: Medication refills are assigned a new Rx number when refilled electronically. Your pharmacy may indicate that no refills were authorized even though a new prescription for the same medication is available at the pharmacy. Please request the medicine by name with the pharmacy before contacting your provider for a refill.           [employer] Access Code: [email or web address]  Expires: 8/9/2017  1:48 PM    [employer]  A secure, online tool to manage your health information     [employer]’s [employer]® is a secure,  online tool that connects you to your personalized health information from the privacy of your home -- day or night - making it very easy for you to manage your healthcare. Once the activation process is completed, you can even access your medical information using the [employer] ger, which is available for free in the Apple Ger store or Google Play store.     [employer] provides the following levels of access (as shown below):   My Chart Features   [facility] Doctor [facility]  Specialists [facility]  Urgent  Care Non-[facility]  Primary Care  Doctor   Email your healthcare team securely and privately 24/7 X X X    Manage appointments: schedule your next appointment; view details of past/upcoming appointments X      Request prescription refills. X      View recent personal medical records, including lab and immunizations X X X X   View health record, including health history, allergies, medications X X X X   Read reports about your outpatient visits, procedures, consult and ER notes X X X X   See your discharge summary, which is a recap of your hospital and/or ER visit that includes your diagnosis, lab results, and care plan. X X       How to register for [employer]:  1. Go to  [email or web address].  2. Click on the Sign Up Now box, which takes you to the New Member Sign Up page. You will need to provide the following information:  a. Enter your [employer] Access Code exactly as it appears at the top of this page. (You will not need to use this code after you’ve completed the sign-up process. If you do not sign up before the expiration date, you must request a new code.)   b. Enter your date of birth.   c. Enter your home email address.   d. Click Submit, and follow the next screen’s instructions.  3. Create a [employer] ID. This will be your [employer] login ID and cannot be changed, so think of one that is secure and easy to remember.  4. Create a [employer] password. You can change your password at any time.  5. Enter your  Password Reset Question and Answer. This can be used at a later time if you forget your password.   6. Enter your e-mail address. This allows you to receive e-mail notifications when new information is available in [employer].  7. Click Sign Up. You can now view your health information.    For assistance activating your [employer] account, call [phone]        Quit Tobacco Information     Do you want to quit using tobacco?    Quitting tobacco decreases risks of cancer, heart and lung disease, increases life expectancy, improves sense of taste and smell, and increases spending money, among other benefits.    If you are thinking about quitting, we can help.  • [facility] Quit Tobacco Program: 599.249.7663  o Program occurs weekly for four weeks and includes pharmacist consultation on products to support quitting smoking or chewing tobacco. A provider referral is needed for pharmacist consultation.  • Tobacco Users Help Hotline: 4-638-QUIT-NOW (777-9757) or https://[place].quitlogix.org/  o Free, confidential telephone and online coaching for [place] residents. Sessions are designed on a schedule that is convenient for you. Eligible clients receive free nicotine replacement therapy.  • Nationally: www.smokefree.gov  o Information and professional assistance to support both immediate and long-term needs as you become, and remain, a non-smoker. Smokefree.gov allows you to choose the help that best fits your needs.

## 2017-07-21 NOTE — Clinical Note
[facility]   [address] [place] [address]. [address] - [place] [address]  Phone: [phone] - Fax: [phone]        [facility] Progress Report and Disability Certification  Date of Service: 7/21/2017   No Show:  No  Date / Time of Next Visit:     Claim Information   Patient Name: Deejay Pham  Claim Number:     Employer: [employer]  Date of Injury: 7/10/2017     Insurer / TPA: [employer]  ID / SSN:     Occupation:  II  Diagnosis: Diagnoses of Laceration of right thigh, subsequent encounter and Visit for suture removal were pertinent to this visit.    Medical Information   Related to Industrial Injury? Yes    Subjective Complaints:  DOI: 7/10/17  Pt notes he was cutting zip ties at work with a dirty utility knife and it slipped and cut into his right thigh. Initial treatment was suture placement at the UC and Tdap. He has kept the sutures clean, dry and covered with a bandage. No additional problems with the laceration.   Objective Findings: Right thigh- healed and well approximated 3 cm laceration. 4 sutures placed but only 2 are present today.  2 sutures removed without difficulty  Steri strips applied for reinforcement   Pre-Existing Condition(s):     Assessment:   Condition Improved    Status: Discharged /  MMI  Permanent Disability:No    Plan:      Diagnostics:      Comments:  No further follow up needed  Discharged MMI      Disability Information   Status: Released to Full Duty    From:     Through:   Restrictions are:     Physical Restrictions   Sitting:    Standing:    Stooping:    Bending:      Squatting:    Walking:    Climbing:    Pushing:      Pulling:    Other:    Reaching Above Shoulder (L):   Reaching Above Shoulder (R):       Reaching Below Shoulder (L):    Reaching Below Shoulder (R):      Not to exceed Weight Limits   Carrying(hrs):   Weight Limit(lb):   Lifting(hrs):   Weight  Limit(lb):     Comments:         Repetitive Actions   Hands: i.e. Fine Manipulations from Grasping:     Feet: i.e. Operating Foot Controls:     Driving / Operate Machinery:     Physician Name: DAVID Osorio Physician Signature: SAMANTHA Qureshi e-Signature: Dr. Bertram Bahena, Medical Director   Clinic Name / Location: [address]  [place] [address] Clinic Phone Number: Dept: [phone]   Appointment Time: 3:00 Pm Visit Start Time: 3:23 PM   Check-In Time:  3:09 Pm Visit Discharge Time: 3:54 PM   Original-Treating Physician or Chiropractor    Page 2-Insurer/TPA    Page 3-Employer    Page 4-Employee

## 2017-07-22 NOTE — PROGRESS NOTES
"Subjective:      Deejay Pham is a 41 y.o. male who presents with Follow-Up      DOI: 7/10/17  Pt notes he was cutting zip ties at work with a dirty utility knife and it slipped and cut into his right thigh. Initial treatment was suture placement at the  and Tdap. He has kept the sutures clean, dry and covered with a bandage. No additional problems with the laceration.     Suture / Staple Removal  Treated in ED: 10 days ago. He tried nothing since the wound repair. The treatment provided significant relief. There has been no drainage from the wound. There is no redness present. There is no swelling present. The pain has improved. He has no difficulty moving the affected extremity or digit.       Review of Systems   Skin:        Healed laceration to right thigh   All other systems reviewed and are negative.    Past Medical History   Diagnosis Date   • Obesity, unspecified    • Hypertension       Past Surgical History   Procedure Laterality Date   • Appendectomy  2005   • Fasciotomy Right 9/16/2016     Procedure: FASCIOTOMY FOOT;  Surgeon: Spencer Brandt D.P.M.;  Location: SURGERY SAME DAY [facility];  Service:       Social History     Social History   • Marital Status:      Spouse Name: N/A   • Number of Children: N/A   • Years of Education: N/A     Occupational History   • Not on file.     Social History Main Topics   • Smoking status: Never Smoker    • Smokeless tobacco: Current User     Types: Chew   • Alcohol Use: 3.0 oz/week     6 Cans of beer per week      Comment: 7/week   • Drug Use: No   • Sexual Activity:     Partners: Female     Other Topics Concern   • Not on file     Social History Narrative          Objective:     /80 mmHg  Pulse 74  Temp(Src) 37 °C (98.6 °F)  Resp 16  Ht 1.905 m (6' 3\")  Wt 120.203 kg (265 lb)  BMI 33.12 kg/m2  SpO2 96%     Physical Exam   Constitutional: He is oriented to person, place, and time. Vital signs are normal. He appears well-developed and " well-nourished.   HENT:   Head: Normocephalic and atraumatic.   Eyes: EOM are normal. Pupils are equal, round, and reactive to light.   Neck: Normal range of motion.   Cardiovascular: Normal rate and regular rhythm.    Pulmonary/Chest: Effort normal.   Musculoskeletal: Normal range of motion.   Neurological: He is alert and oriented to person, place, and time.   Skin: Skin is warm and dry. Laceration noted.        Psychiatric: He has a normal mood and affect. His speech is normal and behavior is normal. Thought content normal.   Vitals reviewed.      Right thigh- healed and well approximated 3 cm laceration. 4 sutures placed but only 2 are present today.  2 sutures removed without difficulty  Steri strips applied for reinforcement       Assessment/Plan:     1. Laceration of right thigh, subsequent encounter    2. Visit for suture removal    Discharged MMI

## 2017-11-09 ENCOUNTER — NON-PROVIDER VISIT (OUTPATIENT)
Dept: URGENT CARE | Facility: PHYSICIAN GROUP | Age: 42
End: 2017-11-09

## 2017-11-09 DIAGNOSIS — Z02.1 PRE-EMPLOYMENT DRUG SCREENING: ICD-10-CM

## 2017-11-09 PROCEDURE — 8907 PR URINE COLLECT ONLY: Performed by: NURSE PRACTITIONER

## 2018-02-23 ENCOUNTER — OCCUPATIONAL MEDICINE (OUTPATIENT)
Dept: URGENT CARE | Facility: PHYSICIAN GROUP | Age: 43
End: 2018-02-23
Payer: COMMERCIAL

## 2018-02-23 ENCOUNTER — APPOINTMENT (OUTPATIENT)
Dept: RADIOLOGY | Facility: IMAGING CENTER | Age: 43
End: 2018-02-23
Attending: PHYSICIAN ASSISTANT
Payer: COMMERCIAL

## 2018-02-23 VITALS
HEART RATE: 74 BPM | SYSTOLIC BLOOD PRESSURE: 130 MMHG | WEIGHT: 279.2 LBS | TEMPERATURE: 99 F | BODY MASS INDEX: 34.71 KG/M2 | HEIGHT: 75 IN | DIASTOLIC BLOOD PRESSURE: 80 MMHG | OXYGEN SATURATION: 96 %

## 2018-02-23 DIAGNOSIS — S46.911A SHOULDER STRAIN, RIGHT, INITIAL ENCOUNTER: ICD-10-CM

## 2018-02-23 DIAGNOSIS — S00.03XA CONTUSION OF SCALP, INITIAL ENCOUNTER: ICD-10-CM

## 2018-02-23 DIAGNOSIS — M54.50 ACUTE MIDLINE LOW BACK PAIN WITHOUT SCIATICA: ICD-10-CM

## 2018-02-23 PROCEDURE — 99214 OFFICE O/P EST MOD 30 MIN: CPT | Mod: 29 | Performed by: PHYSICIAN ASSISTANT

## 2018-02-23 PROCEDURE — 72100 X-RAY EXAM L-S SPINE 2/3 VWS: CPT | Mod: 26,29 | Performed by: PHYSICIAN ASSISTANT

## 2018-02-23 RX ORDER — KETOROLAC TROMETHAMINE 30 MG/ML
60 INJECTION, SOLUTION INTRAMUSCULAR; INTRAVENOUS ONCE
Status: COMPLETED | OUTPATIENT
Start: 2018-02-23 | End: 2018-02-23

## 2018-02-23 RX ADMIN — KETOROLAC TROMETHAMINE 60 MG: 30 INJECTION, SOLUTION INTRAMUSCULAR; INTRAVENOUS at 10:18

## 2018-02-23 ASSESSMENT — PAIN SCALES - GENERAL: PAINLEVEL: 8=MODERATE-SEVERE PAIN

## 2018-02-23 ASSESSMENT — ENCOUNTER SYMPTOMS
CONSTITUTIONAL NEGATIVE: 1
BRUISES/BLEEDS EASILY: 0

## 2018-02-23 NOTE — PROGRESS NOTES
"Subjective:      Deejay Pham is a 42 y.o. male who presents with Other (WC Lower back pain, head, R shoulder, was getting out of work truck and slipped on ice. Patient was not sure if hit head on ground or on truck. Onset at 8am today 2/23/18)      DOI: 02/23/18, 0745.  Areas affected: lower back, right shoulder, head.   Patient states was getting out of work truck and slipped on ice, fell and hit the ground.  Put his right arm down to brace impact, (right shoulder tender now), lower back is throbbing as (he fell primarily on this) and also believes he hit his head on the step of the trunk or the body/door. Did not lose consciousness.   The lower back pain is bilateral. Does not radiate down his legs.    No numbness or tingling/weakness.  No bladder or bowel dysfunction or changes.  Right arm initially had some tingling/numbness but that has worn off, now right shoulder aching a bit.    No right shoulder problems chronically.   Patient notes some dull headache symptoms, mild.    No dizziness or off balance. No vision changes or speech changes.  Notes \"I have dealt with some back pain my whole life,\" some degree on a daily basis.  No hx of accident or previous back injury.  No attempted interventions thus far.      Other     as above.     Review of Systems   Constitutional: Negative.    Musculoskeletal:        SEE HPI   Skin: Negative.    Neurological:        SEE HPI   Endo/Heme/Allergies: Does not bruise/bleed easily.       PMH:  has a past medical history of Hypertension and Obesity, unspecified.  MEDS:   Current Outpatient Prescriptions:   •  lisinopril (PRINIVIL) 20 MG Tab, Take 20 mg by mouth every day., Disp: , Rfl:   ALLERGIES: No Known Allergies  SURGHX:   Past Surgical History:   Procedure Laterality Date   • FASCIOTOMY Right 9/16/2016    Procedure: FASCIOTOMY FOOT;  Surgeon: OMER LanPNettieMNettie;  Location: SURGERY SAME DAY [facility];  Service:    • APPENDECTOMY  2005     SOCHX:  reports that " "he has never smoked. His smokeless tobacco use includes Chew. He reports that he drinks about 3.0 oz of alcohol per week . He reports that he does not use drugs.  FH: Family history was reviewed, no pertinent findings to report     Objective:     /80   Pulse 74   Temp 37.2 °C (99 °F)   Ht 1.905 m (6' 3\")   Wt (!) 126.6 kg (279 lb 3.2 oz)   SpO2 96%   BMI 34.90 kg/m²      Physical Exam   Constitutional: He appears well-developed and well-nourished. No distress.   Eyes: Conjunctivae are normal.   Neck: Normal range of motion. Neck supple.   Cardiovascular: Normal rate.    Pulmonary/Chest: Effort normal.   Skin: Skin is warm and dry.   Psychiatric: He has a normal mood and affect.     Vitals reviewed  Head: normocephalic/atraumatic. There is mild TTP without swelling or ecchymosis of occipital region of skull.   Neuro: A&O x 3.  CN 2-12 grossly intact.  Motor strength upper and lower full and intact bilaterally.  Speech normal/clear.  Normal coordination. Normal gait.   Back: Back: on inspection there is no ecchymosis or deformity.  Mild midline TTP without crepitus.  Mild bilateral paraspinous TTP mid-lower lumbar region.  FROM of back, pain with full flexion.  Bilaleral lower extremities with 5/5 strength, normal sensation throughout and 2+ DTRs.  Normal gait.   Right Shoulder: FROM of shoulder however winces with > 90 degrees flexion and abduction.  No swelling, ecchymosis.  NonTTP at joint capsule.  No clavicular TTP.   Distal UE with 5/5  and distal N/V intact       Assessment/Plan:     1. Acute midline low back pain without sciatica  DX-LUMBAR SPINE-2 OR 3 VIEWS    ketorolac (TORADOL) injection 60 mg   2. Shoulder strain, right, initial encounter     3. Contusion of scalp, initial encounter       -Negative lumbar series for acute changes, mild degenerative changes.   -Toradol shot given in clinic, patient tolerated well.  Monitored for 10 mins s/p shot.  -recommend heat/ice prn.  Gentle stretches " daily.   -Muscle relaxer if needed for bedtime/spasms, patient believes he has at home. No driving, caution drowsy.   -benign Neuro exam, no evidence of head trauma on exam, suspect minimal contusion.    -back pain, neuro red flags and ER precautions.       Kristal Langston P.A.-C.

## 2018-02-23 NOTE — LETTER
"EMPLOYEE’S CLAIM FOR COMPENSATION/ REPORT OF INITIAL TREATMENT  FORM C-4    EMPLOYEE’S CLAIM - PROVIDE ALL INFORMATION REQUESTED   First Name  Deejay Last Name  Vero Birthdate                    1975                Sex  male Claim Number   Home Address  [address] Age  42 y.o. 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  Telephone  [phone]    Employer Address  [address]  Zip  [address]   Date of Injury  2/23/2018               Hour of Injury  7:45 AM Date Employer Notified  2/23/2018 Last Day of Work after Injury or Occupational Disease  2/23/2018 Supervisor to Whom Injury Reported  Siva Silva   Address or Location of Accident (if applicable)  [[address]]   What were you doing at the time of accident? (if applicable)  Stepping out of vehicle    How did this injury or occupational disease occur? (Be specific an answer in detail. Use additional sheet if necessary)  Stepped out truck, slipped on ice.   If you believe that you have an occupational disease, when did you first have knowledge of the disability and it relationship to your employment?  no Witnesses to the Accident  Juve Cardenas      Nature of Injury or Occupational Disease  Strain  Part(s) of Body Injured or Affected  Skull, Lower Back Area (Lumbar Area & Lumbo-Sacral), Shoulder (R)    I certify that the above is true and correct to the best of my knowledge and that I " have provided this information in order to obtain the benefits of [place]  I hereby authorize any physician, chiropractor, surgeon, practitioner, or other person, any hospital, including [facility] or [facility] hospital, any medical service organization, any insurance company, or other institution or organization to release to each other, any medical or other information, including benefits paid or payable, pertinent to this injury or disease, except information relative to diagnosis, treatment and/or counseling for AIDS, psychological conditions, alcohol or controlled substances, for which I must give specific authorization.  A Photostat of this authorization shall be as valid as the original.     Date   Place   Employee’s Signature   THIS REPORT MUST BE COMPLETED AND MAILED WITHIN 3 WORKING DAYS OF TREATMENT   Place  [facility]  Name of Facility  [facility]   Date  2/23/2018 Diagnosis  (M54.5) Acute midline low back pain without sciatica  (S46.911A) Shoulder strain, right, initial encounter  (S00.03XA) Contusion of scalp, initial encounter Is there evidence the injured employee was under the influence of alcohol and/or another controlled substance at the time of accident?   Hour  9:35 AM Description of Injury or Disease  Diagnoses of Acute midline low back pain without sciatica, Shoulder strain, right, initial encounter, and Contusion of scalp, initial encounter were pertinent to this visit. No   Treatment  -Negative lumbar series for acute changes, mild degenerative changes.   -Toradol shot given in clinic, patient tolerated well.  Monitored for 10 mins s/p shot.  -recommend heat/ice prn.  Gentle stretches daily.   -Muscle relaxer if needed for bedtime/spasms, patient believes he has at home. No driving, caution drowsy.   -benign Neuro exam, no evidence of head trauma on  exam, suspect minimal contusion.    -back pain, neuro red flags and ER precautions.   Have you advised the patient to remain off work five days or more? No   X-Ray Findings  Negative   If Yes   From Date  To Date      From information given by the employee, together with medical evidence, can you directly connect this injury or occupational disease as job incurred?  Yes  Comments:Patient is required to be outside/driving as part of work day duties which includes contact with elements/icy roads If No Full Duty  No Modified Duty  Yes   Is additional medical care by a physician indicated?  Yes If Modified Duty, Specify any Limitations / Restrictions  SEE- D-39   Do you know of any previous injury or disease contributing to this condition or occupational disease?                            No  Comments:hx of low back pain however not causitive.    Date  2/23/2018 Print Doctor’s Name Kirit Langston P.A.-C. I certify the employer’s copy of  this form was mailed on:   Address  [address] [place] [address]. [address] Insurer’s Use Only     [facility]  [id number]    Provider’s Tax ID Number  [id number] Telephone  Dept: [phone]        e-KIRIT Borrero P.A.-C.   e-Signature: Dr. Bertram Bahena, Medical Director Degree  SANJUANITA        ORIGINAL-TREATING PHYSICIAN OR CHIROPRACTOR    PAGE 2-INSURER/TPA    PAGE 3-EMPLOYER    PAGE 4-EMPLOYEE             Form C-4 (rev10/07)              BRIEF DESCRIPTION OF RIGHTS AND BENEFITS  (Pursuant to [place]RS 616C.050)    Notice of Injury or Occupational Disease (Incident Report Form C-1): If an injury or occupational disease (OD) arises out of and in the  course of employment, you must provide written notice to your employer as soon as practicable, but no later than 7 days after the accident or  OD. Your employer shall maintain a sufficient supply of the required forms.    Claim for Compensation (Form C-4): If medical treatment is sought, the form C-4 is available at the place of  "initial treatment. A completed  \"Claim for Compensation\" (Form C-4) must be filed within 90 days after an accident or OD. The treating physician or chiropractor must,  within 3 working days after treatment, complete and mail to the employer, the employer's insurer and third-party , the Claim for  Compensation.    Medical Treatment: If you require medical treatment for your on-the-job injury or OD, you may be required to select a physician or  chiropractor from a list provided by your workers’ compensation insurer, if it has contracted with an Organization for Managed Care (MCO) or  Preferred Provider Organization (PPO) or providers of health care. If your employer has not entered into a contract with an MCO or PPO, you  may select a physician or chiropractor from the Panel of Physicians and Chiropractors. Any medical costs related to your industrial injury or  OD will be paid by your insurer.    Temporary Total Disability (TTD): If your doctor has certified that you are unable to work for a period of at least 5 consecutive days, or 5  cumulative days in a 20-day period, or places restrictions on you that your employer does not accommodate, you may be entitled to TTD  compensation.    Temporary Partial Disability (TPD): If the wage you receive upon reemployment is less than the compensation for TTD to which you are  entitled, the insurer may be required to pay you TPD compensation to make up the difference. TPD can only be paid for a maximum of 24  months.    Permanent Partial Disability (PPD): When your medical condition is stable and there is an indication of a PPD as a result of your injury or  OD, within 30 days, your insurer must arrange for an evaluation by a rating physician or chiropractor to determine the degree of your PPD. The  amount of your PPD award depends on the date of injury, the results of the PPD evaluation and your age and wage.    Permanent Total Disability (PTD): If you are " medically certified by a treating physician or chiropractor as permanently and totally disabled  and have been granted a PTD status by your insurer, you are entitled to receive monthly benefits not to exceed 66 2/3% of your average  monthly wage. The amount of your PTD payments is subject to reduction if you previously received a PPD award.    Vocational Rehabilitation Services: You may be eligible for vocational rehabilitation services if you are unable to return to the job due to a  permanent physical impairment or permanent restrictions as a result of your injury or occupational disease.    Transportation and Per Alexei Reimbursement: You may be eligible for travel expenses and per alexei associated with medical treatment.    Reopening: You may be able to reopen your claim if your condition worsens after claim closure.    Appeal Process: If you disagree with a written determination issued by the insurer or the insurer does not respond to your request, you may  appeal to the Department of Administration, , by following the instructions contained in your determination letter. You must  appeal the determination within 70 days from the date of the determination letter at [address], [address], [place]  [address], or [address], [address], [place] [address]. If you disagree with the  decision, you may appeal to the  Department of Administration, . You must file your appeal within 30 days from the date of the  decision  letter at [address], [address], [place], [place] [address], or [address], [address], [place] [address]. If you  disagree with a decision of an , you may file a petition for judicial review with the District Court. You must do so within 30  days of the Appeal Officer’s decision. You may be represented by an  at your own expense or you may contact the [facility] for  possible  representation.    [place]  for Injured Workers (NAIW): If you disagree with a  decision, you may request that NAIW represent you  without charge at an  Hearing. For information regarding denial of benefits, you may contact the NAIW at: [address], [address], [place] [address], (828) [phone], or [address], [place] [address], [phone]) [phone]    To File a Complaint with the Division: If you wish to file a complaint with the  of the Division of Industrial Relations (DIR),  please contact the Workers’ Compensation Section, [address], [address], [place], [place] [address], telephone (475) [phone], or  [address], [address], [place], [place] [address], telephone ([phone]) [phone].    For assistance with Workers’ Compensation Issues: you may contact the Office of the Governor Consumer Health Assistance, [address], [address], [place], [place] [address], Toll Free 1-549.819.3841, Web site: http://govcha.[facility].nv.us, E-mail  [email or web address].[email or web address].                                                                                                                                                                                                                                   __________________________________________________________________                                                                   _________________                Employee Name / Signature                                                                                                                                                       Date                                                                                                                                                                                                     D-2 (rev. 10/07)

## 2018-02-23 NOTE — LETTER
"   [facility] [address] - [place] [address]  Phone:  [phone] - Fax:  [phone]   [facility] Progress Report and Disability Certification  Date of Service: 2/23/2018   No Show:  No  Date / Time of Next Visit: 2/26/2018 @ 4:00 PM   Claim Information   Patient Name: Deejay Pham  Claim Number:     Employer: [employer]  Date of Injury: 2/23/2018     Insurer / TPA: [employer] Claims  ID / SSN:     Occupation: Service Tech II  Diagnosis: Diagnoses of Acute midline low back pain without sciatica, Shoulder strain, right, initial encounter, and Contusion of scalp, initial encounter were pertinent to this visit.    Medical Information   Related to Industrial Injury? Yes    Subjective Complaints:  DOI: 02/23/18, 2445.  Areas affected: lower back, right shoulder, head.   Patient states was getting out of work truck and slipped on ice, fell and hit the ground.  Put his right arm down to brace impact, (right shoulder tender now), lower back is throbbing as (he fell primarily on this) and also believes he hit his head on the step of the trunk or the body/door. Did not lose consciousness.   The lower back pain is bilateral. Does not radiate down his legs.    No numbness or tingling/weakness.  No bladder or bowel dysfunction or changes.  Right arm initially had some tingling/numbness but that has worn off, now right shoulder aching a bit.    No right shoulder problems chronically.   Patient notes some dull headache symptoms, mild.    No dizziness or off balance. No vision changes or speech changes.  Notes \"I have dealt with some back pain my whole life,\" some degree on a daily basis.  No hx of accident or previous back injury.  No attempted interventions thus far.    Objective Findings: Vitals reviewed  Head: normocephalic/atraumatic. There is mild TTP without swelling or ecchymosis of occipital region of skull.   Neuro: A&O x 3. "  CN 2-12 grossly intact.  Motor strength upper and lower full and intact bilaterally.  Speech normal/clear.  Normal coordination. Normal gait.   Back: Back: on inspection there is no ecchymosis or deformity.  Mild midline TTP without crepitus.  Mild bilateral paraspinous TTP mid-lower lumbar region.  FROM of back, pain with full flexion.  Bilaleral lower extremities with 5/5 strength, normal sensation throughout and 2+ DTRs.  Normal gait.   Right Shoulder: FROM of shoulder however winces with > 90 degrees flexion and abduction.  No swelling, ecchymosis.  NonTTP at joint capsule.  No clavicular TTP.   Distal UE with 5/5  and distal N/V intact       Pre-Existing Condition(s): Chronic back pain to some degree.    Assessment:   Initial Visit    Status: Additional Care Required  Permanent Disability:No    Plan:      Diagnostics: X-ray    Comments:  No acute changes, lumbar series, mild degen changes    Disability Information   Status: Released to Restricted Duty    From:  2/23/2018  Through: 2/26/2018 Restrictions are: Temporary   Physical Restrictions   Sitting:    Standing:    Stooping:    Bending:  < or = to 1 hr/day   Squatting:  < or = to 1 hr/day Walking:    Climbing:    Pushing:      Pulling:    Other:    Reaching Above Shoulder (L):   Reaching Above Shoulder (R):       Reaching Below Shoulder (L):    Reaching Below Shoulder (R):      Not to exceed Weight Limits   Carrying(hrs):   Weight Limit(lb): < or = to 10 pounds Lifting(hrs):   Weight  Limit(lb): < or = to 10 pounds   Comments: -Negative lumbar series for acute changes, mild degenerative changes.   -Toradol shot given in clinic, patient tolerated well.  Monitored for 10 mins s/p shot.  -recommend heat/ice prn.  Gentle stretches daily.   -Muscle relaxer if needed for bedtime/spasms, patient believes he has at home. No driving, caution drowsy.   -benign Neuro exam, no evidence of head trauma on exam, suspect minimal contusion.    -back pain, neuro red  flags and ER precautions.          Repetitive Actions   Hands: i.e. Fine Manipulations from Grasping:     Feet: i.e. Operating Foot Controls:     Driving / Operate Machinery:     Physician Name: Kirit Langston P.A.-C. Physician Signature: KIRIT Cabello P.A.-C. e-Signature: Dr. Bertram Bahena, Medical Director   Clinic Name / Location: [address]  [place] [address] Clinic Phone Number: Dept: [phone]   Appointment Time: 9:25 Am Visit Start Time: 9:35 AM   Check-In Time:  9:23 Am Visit Discharge Time:  11:05 AM   Original-Treating Physician or Chiropractor    Page 2-Insurer/TPA    Page 3-Employer    Page 4-Employee

## 2018-02-26 ENCOUNTER — OCCUPATIONAL MEDICINE (OUTPATIENT)
Dept: URGENT CARE | Facility: PHYSICIAN GROUP | Age: 43
End: 2018-02-26
Payer: COMMERCIAL

## 2018-02-26 VITALS
TEMPERATURE: 98.7 F | BODY MASS INDEX: 34.69 KG/M2 | WEIGHT: 279 LBS | OXYGEN SATURATION: 97 % | HEART RATE: 82 BPM | HEIGHT: 75 IN | DIASTOLIC BLOOD PRESSURE: 78 MMHG | SYSTOLIC BLOOD PRESSURE: 126 MMHG

## 2018-02-26 DIAGNOSIS — S30.0XXD LUMBAR CONTUSION, SUBSEQUENT ENCOUNTER: ICD-10-CM

## 2018-02-26 PROCEDURE — 99213 OFFICE O/P EST LOW 20 MIN: CPT | Mod: 29 | Performed by: PHYSICIAN ASSISTANT

## 2018-02-26 NOTE — PROGRESS NOTES
"Subjective:      Deejay Pham is a 42 y.o. male who presents with Back Pain ([facility] DOI 2/23/18 Back and shoulder pain )      DOI: 2/23/18. Slip on ice, landing on lumbar back. Lumbar xray NEG. Pt is \"sore,\" pain 7/10 mainly left lumbar region. Shoulder pain has resolved. Has rested, used ice and heat. Taking Ibuprofen. Has not been back to work yet. Denies any new symptoms today.     HPI    ROS    Medications, Allergies, and current problem list reviewed today in Epic     Objective:     /78   Pulse 82   Temp 37.1 °C (98.7 °F)   Ht 1.905 m (6' 3\")   Wt (!) 126.6 kg (279 lb)   SpO2 97%   BMI 34.87 kg/m²      Physical Exam   Constitutional: He is oriented to person, place, and time. He appears well-developed and well-nourished. No distress.   HENT:   Head: Normocephalic and atraumatic.   Neck: Normal range of motion. Neck supple.   Neurological: He is alert and oriented to person, place, and time.   Skin: Skin is warm and dry. He is not diaphoretic.   Psychiatric: He has a normal mood and affect. His behavior is normal. Judgment and thought content normal.   Nursing note and vitals reviewed.      Mild left lumbar paraspinal tenderness. No midline tenderness. No lesions, ecchymosis, swelling seen. Straight leg raise negative. Lower extremity strength and DTRs equal. Forward flexion does show some pain. Nonantalgic gait       Assessment/Plan:     1. Lumbar contusion, subsequent encounter       Patient mildly improved  Work restrictions advanced  OTC meds and conservative measures as discussed  Follow-up 4 days    Please note that this dictation was created using voice recognition software. I have made every reasonable attempt to correct obvious errors, but I expect that there are errors of grammar and possibly content that I did not discover before finalizing the note.    "

## 2018-02-26 NOTE — LETTER
"   [facility] 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    Medical Information   Related to Industrial Injury? Yes    Subjective Complaints:  DOI: 2/23/18. Slip on ice, landing on lumbar back. Lumbar xray NEG. Pt is \"sore,\" pain 7/10 mainly left lumbar region. Shoulder pain has resolved. Has rested, used ice and heat. Taking Ibuprofen. Has not been back to work yet. Denies any new symptoms today.   Objective Findings: Mild left lumbar paraspinal tenderness. No midline tenderness. No lesions, ecchymosis, swelling seen. Straight leg raise negative. Lower extremity strength and DTRs equal. Forward flexion does show some pain. Nonantalgic gait   Pre-Existing Condition(s): None   Assessment:   Condition Improved    Status: Additional Care Required  Permanent Disability:No    Plan:      Diagnostics:      Comments:       Disability Information   Status: Released to Restricted Duty    From:  2/26/2018  Through: 3/2/2018 Restrictions are: Temporary   Physical Restrictions   Sitting:    Standing:    Stooping:    Bending:  < or = to 2 hrs/day   Squatting:  < or = to 2 hrs/day Walking:    Climbing:    Pushing:      Pulling:    Other:    Reaching Above Shoulder (L):   Reaching Above Shoulder (R):       Reaching Below Shoulder (L):    Reaching Below Shoulder (R):      Not to exceed Weight Limits   Carrying(hrs):   Weight Limit(lb): < or = to 10 pounds Lifting(hrs):   Weight  Limit(lb): < or = to 10 " vicky   Comments:      Repetitive Actions   Hands: i.e. Fine Manipulations from Grasping:     Feet: i.e. Operating Foot Controls:     Driving / Operate Machinery:     Physician Name: Kajal Dias P.A.-C. Physician Signature: KAJAL Gutierrez P.A.-C. e-Signature: Dr. Bertram Bahena, Medical Director   Clinic Name / Location: [address]  [place] [address] Clinic Phone Number: Dept: [phone]   Appointment Time: 4:00 Pm Visit Start Time: 8:08 AM   Check-In Time:  7:52 Am Visit Discharge Time: 8:25 Am   Original-Treating Physician or Chiropractor    Page 2-Insurer/TPA    Page 3-Employer    Page 4-Employee

## 2018-03-02 ENCOUNTER — OCCUPATIONAL MEDICINE (OUTPATIENT)
Dept: URGENT CARE | Facility: PHYSICIAN GROUP | Age: 43
End: 2018-03-02
Payer: COMMERCIAL

## 2018-03-02 VITALS
DIASTOLIC BLOOD PRESSURE: 72 MMHG | WEIGHT: 279 LBS | HEART RATE: 84 BPM | SYSTOLIC BLOOD PRESSURE: 128 MMHG | RESPIRATION RATE: 16 BRPM | HEIGHT: 75 IN | TEMPERATURE: 98 F | BODY MASS INDEX: 34.69 KG/M2 | OXYGEN SATURATION: 97 %

## 2018-03-02 DIAGNOSIS — S30.0XXD LUMBAR CONTUSION, SUBSEQUENT ENCOUNTER: ICD-10-CM

## 2018-03-02 PROCEDURE — 99213 OFFICE O/P EST LOW 20 MIN: CPT | Mod: 29 | Performed by: PHYSICIAN ASSISTANT

## 2018-03-02 NOTE — LETTER
[facility]  [address] [place] [address]. [address] - [place] [address]  Phone:  [phone] - Fax:  [phone]   [facility] Progress Report and Disability Certification  Date of Service: 3/2/2018   No Show:  No  Date / Time of Next Visit:  3/9/18 @ 4:00 PM   Claim Information   Patient Name: Deejay Pham  Claim Number:     Employer: [employer]  Date of Injury: 2/23/2018     Insurer / TPA: [employer]  ID / SSN:     Occupation: Service Tech II  Diagnosis: The encounter diagnosis was Lumbar contusion, subsequent encounter.    Medical Information   Related to Industrial Injury? Yes    Subjective Complaints:  DOI: 2/23/18. Lumbar contusion. Much improved. Pain 4/10 at worst. Mainly midline, does not radiate. Has been stretching. NSAIDS prn. Has been tolerating light duty. Believes he can complete full duty. Denies new symptoms.   Objective Findings: Mild left-sided lumbar paraspinal tenderness. No midline tenderness. Range of motion normal, lower extremity strength and DTRs equal. No ataxia. No lesions, ecchymosis seen.   Pre-Existing Condition(s):     Assessment:   Condition Improved    Status: Additional Care Required  Permanent Disability:No    Plan:      Diagnostics:      Comments:       Disability Information   Status: Released to Full Duty    From:     Through:   Restrictions are:     Physical Restrictions   Sitting:    Standing:    Stooping:    Bending:      Squatting:    Walking:    Climbing:    Pushing:      Pulling:    Other:    Reaching Above Shoulder (L):   Reaching Above Shoulder (R):       Reaching Below Shoulder (L):    Reaching Below Shoulder (R):      Not to exceed Weight Limits   Carrying(hrs):   Weight Limit(lb):   Lifting(hrs):   Weight  Limit(lb):     Comments:      Repetitive Actions   Hands: i.e. Fine Manipulations from Grasping:     Feet: i.e. Operating Foot Controls:     Driving / Operate Machinery:     Physician  Name: Kajal Dias P.A.-C. Physician Signature: KAJAL Gutierrez P.A.-C. e-Signature: Dr. Bertram Bahena, Medical Director   Clinic Name / Location: [address]  [place] [address] Clinic Phone Number: Dept: [phone]   Appointment Time: 4:00 Pm Visit Start Time: 4:02 PM   Check-In Time:  3:41 Pm Visit Discharge Time: 4:25 Pm    Original-Treating Physician or Chiropractor    Page 2-Insurer/TPA    Page 3-Employer    Page 4-Employee

## 2018-03-03 NOTE — PROGRESS NOTES
"Subjective:      Deejay Pham is a 42 y.o. male who presents with Shoulder Pain (R shoulder, head, lower back/WC/ 2/23/18)      DOI: 2/23/18. Lumbar contusion. Much improved. Pain 4/10 at worst. Mainly midline, does not radiate. Has been stretching. NSAIDS prn. Has been tolerating light duty. Believes he can complete full duty. Denies new symptoms.     HPI    ROS    Medications, Allergies, and current problem list reviewed today in Epic     Objective:     /72   Pulse 84   Temp 36.7 °C (98 °F)   Resp 16   Ht 1.905 m (6' 3\")   Wt (!) 126.6 kg (279 lb)   SpO2 97%   BMI 34.87 kg/m²      Physical Exam   Constitutional: He is oriented to person, place, and time. He appears well-developed and well-nourished. No distress.   Neurological: He is alert and oriented to person, place, and time.   Skin: Skin is warm and dry. He is not diaphoretic.   Psychiatric: He has a normal mood and affect. His behavior is normal. Judgment and thought content normal.   Nursing note and vitals reviewed.      Mild left-sided lumbar paraspinal tenderness. No midline tenderness. Range of motion normal, lower extremity strength and DTRs equal. No ataxia. No lesions, ecchymosis seen.       Assessment/Plan:     1. Lumbar contusion, subsequent encounter       Much improved.  Trial full duty  OTC meds and conservative measures as discussed  Follow-up on week    Please note that this dictation was created using voice recognition software. I have made every reasonable attempt to correct obvious errors, but I expect that there are errors of grammar and possibly content that I did not discover before finalizing the note.    "

## 2018-03-09 ENCOUNTER — OCCUPATIONAL MEDICINE (OUTPATIENT)
Dept: URGENT CARE | Facility: PHYSICIAN GROUP | Age: 43
End: 2018-03-09
Payer: COMMERCIAL

## 2018-03-09 VITALS
BODY MASS INDEX: 34.69 KG/M2 | HEART RATE: 71 BPM | WEIGHT: 279 LBS | SYSTOLIC BLOOD PRESSURE: 124 MMHG | TEMPERATURE: 99.7 F | OXYGEN SATURATION: 96 % | HEIGHT: 75 IN | DIASTOLIC BLOOD PRESSURE: 90 MMHG

## 2018-03-09 DIAGNOSIS — S30.0XXD LUMBAR CONTUSION, SUBSEQUENT ENCOUNTER: ICD-10-CM

## 2018-03-09 PROCEDURE — 99213 OFFICE O/P EST LOW 20 MIN: CPT | Mod: 29 | Performed by: PHYSICIAN ASSISTANT

## 2018-03-09 ASSESSMENT — ENCOUNTER SYMPTOMS: BACK PAIN: 1

## 2018-03-09 NOTE — LETTER
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    Medical Information   Related to Industrial Injury? Yes    Subjective Complaints:  DOI: 2/23/18. Lumbar contusion. No further pain. 100% better Tolerated full duty. Not taking anything. Asymptomatic, no new symptoms.   Objective Findings: No tenderness, range of motion normal, distal neurovascular intact.   Pre-Existing Condition(s):     Assessment:   Condition Improved    Status: Discharged /  MMI  Permanent Disability:No    Plan:      Diagnostics:      Comments:       Disability Information   Status: Released to Full Duty    From:     Through:   Restrictions are:     Physical Restrictions   Sitting:    Standing:    Stooping:    Bending:      Squatting:    Walking:    Climbing:    Pushing:      Pulling:    Other:    Reaching Above Shoulder (L):   Reaching Above Shoulder (R):       Reaching Below Shoulder (L):    Reaching Below Shoulder (R):      Not to exceed Weight Limits   Carrying(hrs):   Weight Limit(lb):   Lifting(hrs):   Weight  Limit(lb):     Comments:      Repetitive Actions   Hands: i.e. Fine Manipulations from Grasping:     Feet: i.e. Operating Foot Controls:     Driving / Operate Machinery:     Physician Name: Kajal Dias P.A.-C. Physician Signature: KAJAL Gutierrez P.A.-C. e-Signature: Dr. Bertram Bahena, Medical Director   Clinic Name / Location: [address]  [address]  [place] [address] Clinic Phone Number: Dept: [phone]   Appointment Time: 4:00 Pm Visit Start Time: 3:18 PM   Check-In Time:  3:13 Pm Visit Discharge Time:  3:50 PM   Original-Treating Physician or Chiropractor    Page 2-Insurer/TPA    Page 3-Employer    Page 4-Employee

## 2018-03-09 NOTE — PROGRESS NOTES
"Subjective:      Deejay Pham is a 42 y.o. male who presents with Back Pain ( [facility] )      DOI: 2/23/18. Lumbar contusion. No further pain. 100% better Tolerated full duty. Not taking anything. Asymptomatic, no new symptoms.     Back Pain         Review of Systems   Musculoskeletal: Positive for back pain.       Medications, Allergies, and current problem list reviewed today in Epic     Objective:     /90   Pulse 71   Temp 37.6 °C (99.7 °F)   Ht 1.905 m (6' 3\")   Wt (!) 126.6 kg (279 lb)   SpO2 96%   BMI 34.87 kg/m²      Physical Exam   Constitutional: He is oriented to person, place, and time. He appears well-developed and well-nourished. No distress.   Neurological: He is alert and oriented to person, place, and time.   Skin: Skin is warm and dry. He is not diaphoretic.   Psychiatric: He has a normal mood and affect. His behavior is normal. Judgment and thought content normal.   Nursing note and vitals reviewed.      No tenderness, range of motion normal, distal neurovascular intact.       Assessment/Plan:     1. Lumbar contusion, subsequent encounter       Tolerating full duty, exam normal, symptoms resolved  Patient discharged MMI to full duty    Please note that this dictation was created using voice recognition software. I have made every reasonable attempt to correct obvious errors, but I expect that there are errors of grammar and possibly content that I did not discover before finalizing the note.    "

## 2018-10-03 ENCOUNTER — OFFICE VISIT (OUTPATIENT)
Dept: URGENT CARE | Facility: PHYSICIAN GROUP | Age: 43
End: 2018-10-03
Payer: COMMERCIAL

## 2018-10-03 VITALS
BODY MASS INDEX: 34.69 KG/M2 | OXYGEN SATURATION: 97 % | TEMPERATURE: 98.2 F | WEIGHT: 279 LBS | HEIGHT: 75 IN | HEART RATE: 72 BPM | SYSTOLIC BLOOD PRESSURE: 134 MMHG | RESPIRATION RATE: 16 BRPM | DIASTOLIC BLOOD PRESSURE: 78 MMHG

## 2018-10-03 DIAGNOSIS — A08.4 VIRAL GASTROENTERITIS: ICD-10-CM

## 2018-10-03 PROCEDURE — 99214 OFFICE O/P EST MOD 30 MIN: CPT | Performed by: PHYSICIAN ASSISTANT

## 2018-10-03 ASSESSMENT — ENCOUNTER SYMPTOMS
ABDOMINAL PAIN: 0
HEADACHES: 0
DIARRHEA: 1
CHILLS: 0
VOMITING: 1
NAUSEA: 1
BLOOD IN STOOL: 0
CONSTIPATION: 0
FEVER: 0

## 2018-10-03 NOTE — LETTER
October 3, 2018         Patient: Deejay Pham   YOB: 1975   Date of Visit: 10/3/2018           To Whom it May Concern:    Deejay Pham was seen in my clinic on 10/3/2018. He may return to work on 10/05/2018 or sooner if condition improves sooner.       If you have any questions or concerns, please don't hesitate to call.        Sincerely,           Idania Spicer P.A.-C.  Electronically Signed

## 2018-10-25 ENCOUNTER — HOSPITAL ENCOUNTER (OUTPATIENT)
Dept: LAB | Facility: MEDICAL CENTER | Age: 43
End: 2018-10-25
Attending: FAMILY MEDICINE
Payer: COMMERCIAL

## 2018-10-25 LAB
ALBUMIN SERPL BCP-MCNC: 4.4 G/DL (ref 3.2–4.9)
ALBUMIN/GLOB SERPL: 1.4 G/DL
ALP SERPL-CCNC: 38 U/L (ref 30–99)
ALT SERPL-CCNC: 43 U/L (ref 2–50)
ANION GAP SERPL CALC-SCNC: 9 MMOL/L (ref 0–11.9)
AST SERPL-CCNC: 26 U/L (ref 12–45)
BILIRUB SERPL-MCNC: 0.9 MG/DL (ref 0.1–1.5)
BUN SERPL-MCNC: 16 MG/DL (ref 8–22)
CALCIUM SERPL-MCNC: 10 MG/DL (ref 8.5–10.5)
CHLORIDE SERPL-SCNC: 103 MMOL/L (ref 96–112)
CHOLEST SERPL-MCNC: 264 MG/DL (ref 100–199)
CO2 SERPL-SCNC: 27 MMOL/L (ref 20–33)
CREAT SERPL-MCNC: 1.26 MG/DL (ref 0.5–1.4)
GLOBULIN SER CALC-MCNC: 3.2 G/DL (ref 1.9–3.5)
GLUCOSE SERPL-MCNC: 85 MG/DL (ref 65–99)
HDLC SERPL-MCNC: 64 MG/DL
LDLC SERPL CALC-MCNC: 146 MG/DL
POTASSIUM SERPL-SCNC: 4.2 MMOL/L (ref 3.6–5.5)
PROT SERPL-MCNC: 7.6 G/DL (ref 6–8.2)
SODIUM SERPL-SCNC: 139 MMOL/L (ref 135–145)
TRIGL SERPL-MCNC: 270 MG/DL (ref 0–149)

## 2018-10-25 PROCEDURE — 36415 COLL VENOUS BLD VENIPUNCTURE: CPT

## 2018-10-25 PROCEDURE — 80061 LIPID PANEL: CPT

## 2018-10-25 PROCEDURE — 80053 COMPREHEN METABOLIC PANEL: CPT

## 2019-01-07 ENCOUNTER — SUPERVISING PHYSICIAN REVIEW (OUTPATIENT)
Dept: URGENT CARE | Facility: MEDICAL CENTER | Age: 44
End: 2019-01-07

## 2019-07-24 ENCOUNTER — HOSPITAL ENCOUNTER (OUTPATIENT)
Dept: RADIOLOGY | Facility: MEDICAL CENTER | Age: 44
End: 2019-07-24
Attending: FAMILY MEDICINE
Payer: COMMERCIAL

## 2019-07-24 DIAGNOSIS — J01.90 ACUTE SINUSITIS, RECURRENCE NOT SPECIFIED, UNSPECIFIED LOCATION: ICD-10-CM

## 2019-07-24 PROCEDURE — 70220 X-RAY EXAM OF SINUSES: CPT

## 2019-08-07 ENCOUNTER — HOSPITAL ENCOUNTER (OUTPATIENT)
Dept: RADIOLOGY | Facility: MEDICAL CENTER | Age: 44
End: 2019-08-07
Attending: FAMILY MEDICINE
Payer: COMMERCIAL

## 2019-08-07 DIAGNOSIS — J01.00 ACUTE MAXILLARY SINUSITIS, RECURRENCE NOT SPECIFIED: ICD-10-CM

## 2019-08-07 PROCEDURE — 70486 CT MAXILLOFACIAL W/O DYE: CPT

## 2019-09-08 ENCOUNTER — APPOINTMENT (OUTPATIENT)
Dept: CARDIOLOGY | Facility: MEDICAL CENTER | Age: 44
DRG: 175 | End: 2019-09-08
Attending: EMERGENCY MEDICINE
Payer: COMMERCIAL

## 2019-09-08 ENCOUNTER — APPOINTMENT (OUTPATIENT)
Dept: RADIOLOGY | Facility: MEDICAL CENTER | Age: 44
DRG: 175 | End: 2019-09-08
Attending: HOSPITALIST
Payer: COMMERCIAL

## 2019-09-08 ENCOUNTER — APPOINTMENT (OUTPATIENT)
Dept: RADIOLOGY | Facility: MEDICAL CENTER | Age: 44
DRG: 175 | End: 2019-09-08
Attending: INTERNAL MEDICINE
Payer: COMMERCIAL

## 2019-09-08 ENCOUNTER — HOSPITAL ENCOUNTER (INPATIENT)
Facility: MEDICAL CENTER | Age: 44
LOS: 2 days | DRG: 175 | End: 2019-09-10
Attending: EMERGENCY MEDICINE | Admitting: HOSPITALIST
Payer: COMMERCIAL

## 2019-09-08 ENCOUNTER — APPOINTMENT (OUTPATIENT)
Dept: RADIOLOGY | Facility: MEDICAL CENTER | Age: 44
DRG: 175 | End: 2019-09-08
Attending: EMERGENCY MEDICINE
Payer: COMMERCIAL

## 2019-09-08 DIAGNOSIS — I26.09 ACUTE PULMONARY EMBOLISM WITH ACUTE COR PULMONALE, UNSPECIFIED PULMONARY EMBOLISM TYPE (HCC): ICD-10-CM

## 2019-09-08 PROBLEM — J96.01 ACUTE HYPOXEMIC RESPIRATORY FAILURE (HCC): Status: ACTIVE | Noted: 2019-09-08

## 2019-09-08 PROBLEM — R79.89 ELEVATED TROPONIN: Status: ACTIVE | Noted: 2019-09-08

## 2019-09-08 PROBLEM — I10 HTN (HYPERTENSION): Status: ACTIVE | Noted: 2019-09-08

## 2019-09-08 PROBLEM — I26.99 ACUTE PULMONARY EMBOLISM WITHOUT ACUTE COR PULMONALE (HCC): Status: ACTIVE | Noted: 2019-09-08

## 2019-09-08 PROBLEM — D72.829 LEUKOCYTOSIS: Status: ACTIVE | Noted: 2019-09-08

## 2019-09-08 PROBLEM — E87.20 LACTIC ACIDOSIS: Status: ACTIVE | Noted: 2019-09-08

## 2019-09-08 PROBLEM — R00.0 SINUS TACHYCARDIA: Status: ACTIVE | Noted: 2019-09-08

## 2019-09-08 PROBLEM — I26.92 ACUTE SADDLE PULMONARY EMBOLISM (HCC): Status: ACTIVE | Noted: 2019-09-08

## 2019-09-08 LAB
ALBUMIN SERPL BCP-MCNC: 4.4 G/DL (ref 3.2–4.9)
ALBUMIN/GLOB SERPL: 1.3 G/DL
ALP SERPL-CCNC: 68 U/L (ref 30–99)
ALT SERPL-CCNC: 27 U/L (ref 2–50)
ANION GAP SERPL CALC-SCNC: 13 MMOL/L (ref 0–11.9)
APTT PPP: 30.7 SEC (ref 24.7–36)
AST SERPL-CCNC: 21 U/L (ref 12–45)
BASOPHILS # BLD AUTO: 0.4 % (ref 0–1.8)
BASOPHILS # BLD: 0.05 K/UL (ref 0–0.12)
BILIRUB SERPL-MCNC: 0.8 MG/DL (ref 0.1–1.5)
BUN SERPL-MCNC: 13 MG/DL (ref 8–22)
CALCIUM SERPL-MCNC: 9.1 MG/DL (ref 8.5–10.5)
CHLORIDE SERPL-SCNC: 102 MMOL/L (ref 96–112)
CO2 SERPL-SCNC: 23 MMOL/L (ref 20–33)
CREAT SERPL-MCNC: 1.27 MG/DL (ref 0.5–1.4)
EKG IMPRESSION: NORMAL
EOSINOPHIL # BLD AUTO: 0.21 K/UL (ref 0–0.51)
EOSINOPHIL NFR BLD: 1.7 % (ref 0–6.9)
ERYTHROCYTE [DISTWIDTH] IN BLOOD BY AUTOMATED COUNT: 41.9 FL (ref 35.9–50)
GLOBULIN SER CALC-MCNC: 3.3 G/DL (ref 1.9–3.5)
GLUCOSE SERPL-MCNC: 145 MG/DL (ref 65–99)
HCT VFR BLD AUTO: 52.6 % (ref 42–52)
HGB BLD-MCNC: 17.5 G/DL (ref 14–18)
IMM GRANULOCYTES # BLD AUTO: 0.05 K/UL (ref 0–0.11)
IMM GRANULOCYTES NFR BLD AUTO: 0.4 % (ref 0–0.9)
INR PPP: 1.03 (ref 0.87–1.13)
LACTATE BLD-SCNC: 2.3 MMOL/L (ref 0.5–2)
LACTATE BLD-SCNC: 3 MMOL/L (ref 0.5–2)
LV EJECT FRACT  99904: 55
LV EJECT FRACT MOD 2C 99903: 46.61
LV EJECT FRACT MOD 4C 99902: 28.23
LV EJECT FRACT MOD BP 99901: 33.9
LYMPHOCYTES # BLD AUTO: 1.39 K/UL (ref 1–4.8)
LYMPHOCYTES NFR BLD: 11.5 % (ref 22–41)
MCH RBC QN AUTO: 30.8 PG (ref 27–33)
MCHC RBC AUTO-ENTMCNC: 33.3 G/DL (ref 33.7–35.3)
MCV RBC AUTO: 92.4 FL (ref 81.4–97.8)
MONOCYTES # BLD AUTO: 0.77 K/UL (ref 0–0.85)
MONOCYTES NFR BLD AUTO: 6.4 % (ref 0–13.4)
NEUTROPHILS # BLD AUTO: 9.57 K/UL (ref 1.82–7.42)
NEUTROPHILS NFR BLD: 79.6 % (ref 44–72)
NRBC # BLD AUTO: 0 K/UL
NRBC BLD-RTO: 0 /100 WBC
NT-PROBNP SERPL IA-MCNC: 2957 PG/ML (ref 0–125)
PLATELET # BLD AUTO: 207 K/UL (ref 164–446)
PMV BLD AUTO: 10.8 FL (ref 9–12.9)
POTASSIUM SERPL-SCNC: 3.8 MMOL/L (ref 3.6–5.5)
PROT SERPL-MCNC: 7.7 G/DL (ref 6–8.2)
PROTHROMBIN TIME: 13.7 SEC (ref 12–14.6)
RBC # BLD AUTO: 5.69 M/UL (ref 4.7–6.1)
SODIUM SERPL-SCNC: 138 MMOL/L (ref 135–145)
TROPONIN T SERPL-MCNC: 245 NG/L (ref 6–19)
TROPONIN T SERPL-MCNC: 375 NG/L (ref 6–19)
WBC # BLD AUTO: 12 K/UL (ref 4.8–10.8)

## 2019-09-08 PROCEDURE — 99291 CRITICAL CARE FIRST HOUR: CPT

## 2019-09-08 PROCEDURE — 700111 HCHG RX REV CODE 636 W/ 250 OVERRIDE (IP): Mod: JW | Performed by: EMERGENCY MEDICINE

## 2019-09-08 PROCEDURE — 80053 COMPREHEN METABOLIC PANEL: CPT

## 2019-09-08 PROCEDURE — 3E03317 INTRODUCTION OF OTHER THROMBOLYTIC INTO PERIPHERAL VEIN, PERCUTANEOUS APPROACH: ICD-10-PCS | Performed by: EMERGENCY MEDICINE

## 2019-09-08 PROCEDURE — 84484 ASSAY OF TROPONIN QUANT: CPT

## 2019-09-08 PROCEDURE — 700105 HCHG RX REV CODE 258: Performed by: EMERGENCY MEDICINE

## 2019-09-08 PROCEDURE — 71275 CT ANGIOGRAPHY CHEST: CPT

## 2019-09-08 PROCEDURE — 93005 ELECTROCARDIOGRAM TRACING: CPT | Performed by: EMERGENCY MEDICINE

## 2019-09-08 PROCEDURE — 99223 1ST HOSP IP/OBS HIGH 75: CPT | Performed by: HOSPITALIST

## 2019-09-08 PROCEDURE — 85610 PROTHROMBIN TIME: CPT

## 2019-09-08 PROCEDURE — 94640 AIRWAY INHALATION TREATMENT: CPT

## 2019-09-08 PROCEDURE — 85730 THROMBOPLASTIN TIME PARTIAL: CPT

## 2019-09-08 PROCEDURE — 93005 ELECTROCARDIOGRAM TRACING: CPT

## 2019-09-08 PROCEDURE — 93306 TTE W/DOPPLER COMPLETE: CPT | Mod: 26 | Performed by: INTERNAL MEDICINE

## 2019-09-08 PROCEDURE — 87040 BLOOD CULTURE FOR BACTERIA: CPT

## 2019-09-08 PROCEDURE — 85301 ANTITHROMBIN III ANTIGEN: CPT

## 2019-09-08 PROCEDURE — 93970 EXTREMITY STUDY: CPT

## 2019-09-08 PROCEDURE — 37195 THROMBOLYTIC THERAPY STROKE: CPT

## 2019-09-08 PROCEDURE — 770022 HCHG ROOM/CARE - ICU (200)

## 2019-09-08 PROCEDURE — 83880 ASSAY OF NATRIURETIC PEPTIDE: CPT

## 2019-09-08 PROCEDURE — 85300 ANTITHROMBIN III ACTIVITY: CPT

## 2019-09-08 PROCEDURE — 96365 THER/PROPH/DIAG IV INF INIT: CPT

## 2019-09-08 PROCEDURE — 83605 ASSAY OF LACTIC ACID: CPT | Mod: 91

## 2019-09-08 PROCEDURE — 700111 HCHG RX REV CODE 636 W/ 250 OVERRIDE (IP): Performed by: HOSPITALIST

## 2019-09-08 PROCEDURE — 700117 HCHG RX CONTRAST REV CODE 255: Performed by: EMERGENCY MEDICINE

## 2019-09-08 PROCEDURE — 85025 COMPLETE CBC W/AUTO DIFF WBC: CPT

## 2019-09-08 PROCEDURE — 99291 CRITICAL CARE FIRST HOUR: CPT | Performed by: INTERNAL MEDICINE

## 2019-09-08 PROCEDURE — 85306 CLOT INHIBIT PROT S FREE: CPT

## 2019-09-08 PROCEDURE — 93306 TTE W/DOPPLER COMPLETE: CPT

## 2019-09-08 PROCEDURE — 71045 X-RAY EXAM CHEST 1 VIEW: CPT

## 2019-09-08 PROCEDURE — 700111 HCHG RX REV CODE 636 W/ 250 OVERRIDE (IP): Performed by: INTERNAL MEDICINE

## 2019-09-08 RX ORDER — ONDANSETRON 4 MG/1
4 TABLET, ORALLY DISINTEGRATING ORAL EVERY 4 HOURS PRN
Status: DISCONTINUED | OUTPATIENT
Start: 2019-09-08 | End: 2019-09-10 | Stop reason: HOSPADM

## 2019-09-08 RX ORDER — ONDANSETRON 2 MG/ML
4 INJECTION INTRAMUSCULAR; INTRAVENOUS EVERY 4 HOURS PRN
Status: DISCONTINUED | OUTPATIENT
Start: 2019-09-08 | End: 2019-09-10 | Stop reason: HOSPADM

## 2019-09-08 RX ORDER — SODIUM CHLORIDE 9 MG/ML
INJECTION, SOLUTION INTRAVENOUS ONCE
Status: COMPLETED | OUTPATIENT
Start: 2019-09-08 | End: 2019-09-08

## 2019-09-08 RX ORDER — HEPARIN SODIUM 1000 [USP'U]/ML
4000 INJECTION, SOLUTION INTRAVENOUS; SUBCUTANEOUS PRN
Status: DISCONTINUED | OUTPATIENT
Start: 2019-09-08 | End: 2019-09-09

## 2019-09-08 RX ORDER — PROCHLORPERAZINE EDISYLATE 5 MG/ML
5-10 INJECTION INTRAMUSCULAR; INTRAVENOUS EVERY 4 HOURS PRN
Status: DISCONTINUED | OUTPATIENT
Start: 2019-09-08 | End: 2019-09-10 | Stop reason: HOSPADM

## 2019-09-08 RX ORDER — LISINOPRIL 20 MG/1
20 TABLET ORAL
Status: DISCONTINUED | OUTPATIENT
Start: 2019-09-09 | End: 2019-09-10 | Stop reason: HOSPADM

## 2019-09-08 RX ORDER — PROMETHAZINE HYDROCHLORIDE 25 MG/1
12.5-25 TABLET ORAL EVERY 4 HOURS PRN
Status: DISCONTINUED | OUTPATIENT
Start: 2019-09-08 | End: 2019-09-10 | Stop reason: HOSPADM

## 2019-09-08 RX ORDER — HYDRALAZINE HYDROCHLORIDE 20 MG/ML
10 INJECTION INTRAMUSCULAR; INTRAVENOUS EVERY 6 HOURS PRN
Status: DISCONTINUED | OUTPATIENT
Start: 2019-09-08 | End: 2019-09-10 | Stop reason: HOSPADM

## 2019-09-08 RX ORDER — PROMETHAZINE HYDROCHLORIDE 25 MG/1
12.5-25 SUPPOSITORY RECTAL EVERY 4 HOURS PRN
Status: DISCONTINUED | OUTPATIENT
Start: 2019-09-08 | End: 2019-09-10 | Stop reason: HOSPADM

## 2019-09-08 RX ORDER — BISACODYL 10 MG
10 SUPPOSITORY, RECTAL RECTAL
Status: DISCONTINUED | OUTPATIENT
Start: 2019-09-08 | End: 2019-09-10 | Stop reason: HOSPADM

## 2019-09-08 RX ORDER — HEPARIN SODIUM 5000 [USP'U]/100ML
INJECTION, SOLUTION INTRAVENOUS CONTINUOUS
Status: DISCONTINUED | OUTPATIENT
Start: 2019-09-08 | End: 2019-09-09

## 2019-09-08 RX ORDER — POLYETHYLENE GLYCOL 3350 17 G/17G
1 POWDER, FOR SOLUTION ORAL
Status: DISCONTINUED | OUTPATIENT
Start: 2019-09-08 | End: 2019-09-10 | Stop reason: HOSPADM

## 2019-09-08 RX ORDER — AMOXICILLIN 250 MG
2 CAPSULE ORAL 2 TIMES DAILY
Status: DISCONTINUED | OUTPATIENT
Start: 2019-09-09 | End: 2019-09-10 | Stop reason: HOSPADM

## 2019-09-08 RX ORDER — SODIUM CHLORIDE 9 MG/ML
30 INJECTION, SOLUTION INTRAVENOUS ONCE
Status: COMPLETED | OUTPATIENT
Start: 2019-09-08 | End: 2019-09-08

## 2019-09-08 RX ORDER — MORPHINE SULFATE 4 MG/ML
2 INJECTION, SOLUTION INTRAMUSCULAR; INTRAVENOUS
Status: DISCONTINUED | OUTPATIENT
Start: 2019-09-08 | End: 2019-09-10 | Stop reason: HOSPADM

## 2019-09-08 RX ADMIN — SODIUM CHLORIDE 1000 ML: 9 INJECTION, SOLUTION INTRAVENOUS at 17:53

## 2019-09-08 RX ADMIN — HYDRALAZINE HYDROCHLORIDE 10 MG: 20 INJECTION INTRAMUSCULAR; INTRAVENOUS at 20:38

## 2019-09-08 RX ADMIN — SODIUM CHLORIDE: 9 INJECTION, SOLUTION INTRAVENOUS at 18:45

## 2019-09-08 RX ADMIN — ALTEPLASE 10 MG: KIT at 18:32

## 2019-09-08 RX ADMIN — CEFTRIAXONE SODIUM 2 G: 2 INJECTION, POWDER, FOR SOLUTION INTRAMUSCULAR; INTRAVENOUS at 17:53

## 2019-09-08 RX ADMIN — ALTEPLASE 40 MG: KIT at 18:33

## 2019-09-08 RX ADMIN — HEPARIN SODIUM 1000 UNITS/HR: 5000 INJECTION, SOLUTION INTRAVENOUS at 20:44

## 2019-09-08 RX ADMIN — HEPARIN SODIUM 4000 UNITS: 1000 INJECTION, SOLUTION INTRAVENOUS; SUBCUTANEOUS at 20:41

## 2019-09-08 RX ADMIN — IOHEXOL 100 ML: 350 INJECTION, SOLUTION INTRAVENOUS at 17:52

## 2019-09-08 ASSESSMENT — COGNITIVE AND FUNCTIONAL STATUS - GENERAL
SUGGESTED CMS G CODE MODIFIER DAILY ACTIVITY: CH
DAILY ACTIVITIY SCORE: 24
MOBILITY SCORE: 24
SUGGESTED CMS G CODE MODIFIER MOBILITY: CH

## 2019-09-08 ASSESSMENT — LIFESTYLE VARIABLES
TOTAL SCORE: 0
EVER_SMOKED: NEVER
TOTAL SCORE: 0
DO YOU DRINK ALCOHOL: YES
EVER FELT BAD OR GUILTY ABOUT YOUR DRINKING: NO
ALCOHOL_USE: YES
SUBSTANCE_ABUSE: 0
EVER_SMOKED: NEVER
ON A TYPICAL DAY WHEN YOU DRINK ALCOHOL HOW MANY DRINKS DO YOU HAVE: 2
EVER_SMOKED: NEVER
AVERAGE NUMBER OF DAYS PER WEEK YOU HAVE A DRINK CONTAINING ALCOHOL: 10
HAVE PEOPLE ANNOYED YOU BY CRITICIZING YOUR DRINKING: NO
EVER HAD A DRINK FIRST THING IN THE MORNING TO STEADY YOUR NERVES TO GET RID OF A HANGOVER: NO
TOTAL SCORE: 0
DOES PATIENT WANT TO STOP DRINKING: NO
HOW MANY TIMES IN THE PAST YEAR HAVE YOU HAD 5 OR MORE DRINKS IN A DAY: 0
CONSUMPTION TOTAL: POSITIVE
HAVE YOU EVER FELT YOU SHOULD CUT DOWN ON YOUR DRINKING: NO

## 2019-09-08 ASSESSMENT — ENCOUNTER SYMPTOMS
ABDOMINAL PAIN: 0
FEVER: 0
HEADACHES: 0
EYE DISCHARGE: 0
HEARTBURN: 0
HALLUCINATIONS: 0
SHORTNESS OF BREATH: 1
SINUS PAIN: 0
SEIZURES: 0
SHORTNESS OF BREATH: 0
PALPITATIONS: 1
DIZZINESS: 0
CHILLS: 0
BLURRED VISION: 0
FALLS: 0
VOMITING: 0
FOCAL WEAKNESS: 0
COUGH: 0
DEPRESSION: 0
HEMOPTYSIS: 0
NAUSEA: 0
FLANK PAIN: 0
BACK PAIN: 0
SENSORY CHANGE: 0
WEAKNESS: 0
ORTHOPNEA: 0
BRUISES/BLEEDS EASILY: 0
DIARRHEA: 0
MYALGIAS: 0
SPEECH CHANGE: 0
NERVOUS/ANXIOUS: 0
DOUBLE VISION: 0
PALPITATIONS: 0
SPUTUM PRODUCTION: 0

## 2019-09-08 ASSESSMENT — PATIENT HEALTH QUESTIONNAIRE - PHQ9
2. FEELING DOWN, DEPRESSED, IRRITABLE, OR HOPELESS: NOT AT ALL
SUM OF ALL RESPONSES TO PHQ9 QUESTIONS 1 AND 2: 0
1. LITTLE INTEREST OR PLEASURE IN DOING THINGS: NOT AT ALL

## 2019-09-08 ASSESSMENT — COPD QUESTIONNAIRES
DURING THE PAST 4 WEEKS HOW MUCH DID YOU FEEL SHORT OF BREATH: NONE/LITTLE OF THE TIME
DO YOU EVER COUGH UP ANY MUCUS OR PHLEGM?: NO/ONLY WITH OCCASIONAL COLDS OR INFECTIONS
COPD SCREENING SCORE: 0
HAVE YOU SMOKED AT LEAST 100 CIGARETTES IN YOUR ENTIRE LIFE: NO/DON'T KNOW

## 2019-09-08 NOTE — ED NOTES
Chief Complaint   Patient presents with   • Shortness of Breath     for several days. Much worse this afternoon.      SpO2 low 80's on RA, improves to 92 with non-rebreather. Chart up for ERP.

## 2019-09-09 LAB
ALBUMIN SERPL BCP-MCNC: 3.5 G/DL (ref 3.2–4.9)
ALBUMIN/GLOB SERPL: 1.2 G/DL
ALP SERPL-CCNC: 56 U/L (ref 30–99)
ALT SERPL-CCNC: 21 U/L (ref 2–50)
ANION GAP SERPL CALC-SCNC: 11 MMOL/L (ref 0–11.9)
APPEARANCE UR: CLEAR
APTT PPP: 43.3 SEC (ref 24.7–36)
APTT PPP: 46.2 SEC (ref 24.7–36)
APTT PPP: 48.2 SEC (ref 24.7–36)
AST SERPL-CCNC: 28 U/L (ref 12–45)
BACTERIA #/AREA URNS HPF: NEGATIVE /HPF
BASOPHILS # BLD AUTO: 0.3 % (ref 0–1.8)
BASOPHILS # BLD: 0.03 K/UL (ref 0–0.12)
BILIRUB SERPL-MCNC: 0.7 MG/DL (ref 0.1–1.5)
BILIRUB UR QL STRIP.AUTO: NEGATIVE
BUN SERPL-MCNC: 12 MG/DL (ref 8–22)
CALCIUM SERPL-MCNC: 8.4 MG/DL (ref 8.5–10.5)
CHLORIDE SERPL-SCNC: 107 MMOL/L (ref 96–112)
CO2 SERPL-SCNC: 22 MMOL/L (ref 20–33)
COLOR UR: ABNORMAL
CREAT SERPL-MCNC: 1.22 MG/DL (ref 0.5–1.4)
EKG IMPRESSION: NORMAL
EOSINOPHIL # BLD AUTO: 0.11 K/UL (ref 0–0.51)
EOSINOPHIL NFR BLD: 1.1 % (ref 0–6.9)
EPI CELLS #/AREA URNS HPF: NEGATIVE /HPF
ERYTHROCYTE [DISTWIDTH] IN BLOOD BY AUTOMATED COUNT: 42.5 FL (ref 35.9–50)
GLOBULIN SER CALC-MCNC: 2.9 G/DL (ref 1.9–3.5)
GLUCOSE SERPL-MCNC: 125 MG/DL (ref 65–99)
GLUCOSE UR STRIP.AUTO-MCNC: 100 MG/DL
GRAN CASTS #/AREA URNS LPF: ABNORMAL /LPF
HCT VFR BLD AUTO: 46.5 % (ref 42–52)
HGB BLD-MCNC: 15.4 G/DL (ref 14–18)
HYALINE CASTS #/AREA URNS LPF: ABNORMAL /LPF
IMM GRANULOCYTES # BLD AUTO: 0.04 K/UL (ref 0–0.11)
IMM GRANULOCYTES NFR BLD AUTO: 0.4 % (ref 0–0.9)
KETONES UR STRIP.AUTO-MCNC: ABNORMAL MG/DL
LEUKOCYTE ESTERASE UR QL STRIP.AUTO: NEGATIVE
LYMPHOCYTES # BLD AUTO: 2.1 K/UL (ref 1–4.8)
LYMPHOCYTES NFR BLD: 21 % (ref 22–41)
MCH RBC QN AUTO: 30.7 PG (ref 27–33)
MCHC RBC AUTO-ENTMCNC: 33.1 G/DL (ref 33.7–35.3)
MCV RBC AUTO: 92.6 FL (ref 81.4–97.8)
MICRO URNS: ABNORMAL
MONOCYTES # BLD AUTO: 0.85 K/UL (ref 0–0.85)
MONOCYTES NFR BLD AUTO: 8.5 % (ref 0–13.4)
MUCOUS THREADS #/AREA URNS HPF: ABNORMAL /HPF
NEUTROPHILS # BLD AUTO: 6.89 K/UL (ref 1.82–7.42)
NEUTROPHILS NFR BLD: 68.7 % (ref 44–72)
NITRITE UR QL STRIP.AUTO: NEGATIVE
NRBC # BLD AUTO: 0 K/UL
NRBC BLD-RTO: 0 /100 WBC
PH UR STRIP.AUTO: 5.5 [PH] (ref 5–8)
PLATELET # BLD AUTO: 192 K/UL (ref 164–446)
PMV BLD AUTO: 11.1 FL (ref 9–12.9)
POTASSIUM SERPL-SCNC: 4.3 MMOL/L (ref 3.6–5.5)
PROT SERPL-MCNC: 6.4 G/DL (ref 6–8.2)
PROT UR QL STRIP: 300 MG/DL
RBC # BLD AUTO: 5.02 M/UL (ref 4.7–6.1)
RBC # URNS HPF: ABNORMAL /HPF
RBC UR QL AUTO: ABNORMAL
SODIUM SERPL-SCNC: 140 MMOL/L (ref 135–145)
SP GR UR STRIP.AUTO: 1.03
TROPONIN T SERPL-MCNC: 338 NG/L (ref 6–19)
UROBILINOGEN UR STRIP.AUTO-MCNC: 0.2 MG/DL
WBC # BLD AUTO: 10 K/UL (ref 4.8–10.8)
WBC #/AREA URNS HPF: ABNORMAL /HPF

## 2019-09-09 PROCEDURE — 94640 AIRWAY INHALATION TREATMENT: CPT

## 2019-09-09 PROCEDURE — 700111 HCHG RX REV CODE 636 W/ 250 OVERRIDE (IP): Performed by: HOSPITALIST

## 2019-09-09 PROCEDURE — 700111 HCHG RX REV CODE 636 W/ 250 OVERRIDE (IP): Performed by: INTERNAL MEDICINE

## 2019-09-09 PROCEDURE — 99232 SBSQ HOSP IP/OBS MODERATE 35: CPT | Performed by: HOSPITALIST

## 2019-09-09 PROCEDURE — 93005 ELECTROCARDIOGRAM TRACING: CPT | Performed by: INTERNAL MEDICINE

## 2019-09-09 PROCEDURE — 85025 COMPLETE CBC W/AUTO DIFF WBC: CPT

## 2019-09-09 PROCEDURE — 84484 ASSAY OF TROPONIN QUANT: CPT

## 2019-09-09 PROCEDURE — A9270 NON-COVERED ITEM OR SERVICE: HCPCS | Performed by: HOSPITALIST

## 2019-09-09 PROCEDURE — 81001 URINALYSIS AUTO W/SCOPE: CPT

## 2019-09-09 PROCEDURE — 93010 ELECTROCARDIOGRAM REPORT: CPT | Performed by: INTERNAL MEDICINE

## 2019-09-09 PROCEDURE — 85730 THROMBOPLASTIN TIME PARTIAL: CPT

## 2019-09-09 PROCEDURE — 85303 CLOT INHIBIT PROT C ACTIVITY: CPT

## 2019-09-09 PROCEDURE — 81241 F5 GENE: CPT

## 2019-09-09 PROCEDURE — 80053 COMPREHEN METABOLIC PANEL: CPT

## 2019-09-09 PROCEDURE — 87086 URINE CULTURE/COLONY COUNT: CPT

## 2019-09-09 PROCEDURE — 770020 HCHG ROOM/CARE - TELE (206)

## 2019-09-09 PROCEDURE — 700102 HCHG RX REV CODE 250 W/ 637 OVERRIDE(OP): Performed by: HOSPITALIST

## 2019-09-09 PROCEDURE — 99233 SBSQ HOSP IP/OBS HIGH 50: CPT | Performed by: INTERNAL MEDICINE

## 2019-09-09 RX ORDER — HEPARIN SODIUM 1000 [USP'U]/ML
4000 INJECTION, SOLUTION INTRAVENOUS; SUBCUTANEOUS PRN
Status: DISPENSED | OUTPATIENT
Start: 2019-09-09 | End: 2019-09-09

## 2019-09-09 RX ORDER — HEPARIN SODIUM 5000 [USP'U]/100ML
INJECTION, SOLUTION INTRAVENOUS CONTINUOUS
Status: ACTIVE | OUTPATIENT
Start: 2019-09-09 | End: 2019-09-09

## 2019-09-09 RX ADMIN — LISINOPRIL 20 MG: 20 TABLET ORAL at 21:59

## 2019-09-09 RX ADMIN — HEPARIN SODIUM 4000 UNITS: 1000 INJECTION, SOLUTION INTRAVENOUS; SUBCUTANEOUS at 16:17

## 2019-09-09 RX ADMIN — RIVAROXABAN 15 MG: 15 TABLET, FILM COATED ORAL at 18:46

## 2019-09-09 RX ADMIN — HEPARIN SODIUM 4000 UNITS: 1000 INJECTION, SOLUTION INTRAVENOUS; SUBCUTANEOUS at 09:31

## 2019-09-09 RX ADMIN — HEPARIN SODIUM 4000 UNITS: 1000 INJECTION, SOLUTION INTRAVENOUS; SUBCUTANEOUS at 02:51

## 2019-09-09 RX ADMIN — HYDRALAZINE HYDROCHLORIDE 10 MG: 20 INJECTION INTRAMUSCULAR; INTRAVENOUS at 22:00

## 2019-09-09 ASSESSMENT — ENCOUNTER SYMPTOMS
FEVER: 0
DOUBLE VISION: 0
STRIDOR: 0
DEPRESSION: 0
BLURRED VISION: 0
NERVOUS/ANXIOUS: 0
FOCAL WEAKNESS: 0
COUGH: 0
PALPITATIONS: 0
DIZZINESS: 0
DIAPHORESIS: 0
MYALGIAS: 0
CHILLS: 0
HEMOPTYSIS: 0
BLOOD IN STOOL: 0
WEIGHT LOSS: 0
NAUSEA: 0
SHORTNESS OF BREATH: 0
ABDOMINAL PAIN: 0
SPEECH CHANGE: 0
VOMITING: 0
HEADACHES: 0

## 2019-09-09 NOTE — CARE PLAN
Problem: Knowledge Deficit  Goal: Knowledge of disease process/condition, treatment plan, diagnostic tests, and medications will improve  Note:   POC discussed with the patient and family. All questions and concerns addressed and answered. Patient is involved in POC and verbalizes the understanding of the disease process, medications, tests and treatments. Questions on POC encouraged throughout care.       Problem: Respiratory:  Goal: Respiratory status will improve  Note:   Respiratory rate and rhythm assessed and monitored. Oxygenation and saturation monitored and titrated as ordered. Patient positioned optimally.

## 2019-09-09 NOTE — ASSESSMENT & PLAN NOTE
Multiple Bilateral PE noted on CT   There is concerns of right heart strain and patient was transferred to [facility] for consideration of TPA.  TPA was given and he is being monitored in the ICU.  Heparin drip to be initiated to Xarelto.  Reviewed with  9/9 patient apparently has insurance to cover for Xarelto  9/8 DVT studies  Extensive LEFT lower extremity DVT with 'floating tail' No RIGHT lower extremity DVT  Echocardiogram with reserved ejection fraction no significant right heart strain as of 9/8 p.m.  Suspect provoked given long car ride from [place].  No constitutional symptoms otherwise.  Consider coagulopathy workup status post coagulation in the future

## 2019-09-09 NOTE — ASSESSMENT & PLAN NOTE
Hx of not on statin therapy   9/9 discussion on weight loss and increase activity in the near future discussed

## 2019-09-09 NOTE — CARE PLAN
Problem: Communication  Goal: The ability to communicate needs accurately and effectively will improve  Outcome: PROGRESSING AS EXPECTED  Pt able to communicate needs effectively.      Problem: Safety  Goal: Will remain free from injury  Outcome: PROGRESSING AS EXPECTED   Bed locked and in low position, Lower bed rails raised, bed alarm in use, call light within reach, treaded slipper socks on patient and patient room near nursing station. Pt educated on calling when in need of assistance.

## 2019-09-09 NOTE — ASSESSMENT & PLAN NOTE
Suspect due to hypoxia   Not sepsis   Decrease in lactic acids go ahead and discontinue lactic acid serum check

## 2019-09-09 NOTE — ED PROVIDER NOTES
ED Provider Note    Scribed for Heri Rubio M.D. by Amarilis Warner. 9/8/2019  5:15 PM    Primary care provider: Nikki Wright M.D.  Means of arrival: EMS  History obtained from: Patient  History limited by: None    CHIEF COMPLAINT  Chief Complaint   Patient presents with   • Shortness of Breath     for several days. Much worse this afternoon.        HPI  Deejay Pham is a 43 y.o. male who presents to the Emergency Department for evaluation of acute, constant shortness of breath onset several days ago with worsening severity this afternoon prompting him to visit the ED today for further evaluation of his condition. He notes that he had a eight hour car ride to and from [place] two weeks ago and has been experiencing posterior left calf pain and swelling since his return. No exacerbating factors were reported for his calf pain. The patient does not report taking any over the counter medications for their current symptoms. He states that his calf shanta has been moderately improved with duration but he states that once his calf pain resolved he began to experience shortness of breath which has increased in severity since onset. The patient notes that while walking around at work earlier today he was experiencing moderate difficulty breathing but denies experiencing recent fevers, dizziness, or lightheadedness. Past medical history includes hypertension.       REVIEW OF SYSTEMS  Pertinent negatives include no recent fevers, dizziness or lightheadedness. As above, all other systems reviewed and are negative.   See HPI for further details.     PAST MEDICAL HISTORY   has a past medical history of Hypertension and Obesity, unspecified.    SURGICAL HISTORY   has a past surgical history that includes appendectomy (2005) and fasciotomy (Right, 9/16/2016).    SOCIAL HISTORY  Social History     Tobacco Use   • Smoking status: Never Smoker   • Smokeless tobacco: Current User     Types: Chew   Substance Use Topics   •  "Alcohol use: Yes     Alcohol/week: 3.0 oz     Types: 6 Cans of beer per week     Comment: 7/week   • Drug use: No      Social History     Substance and Sexual Activity   Drug Use No       FAMILY HISTORY  Family History   Problem Relation Age of Onset   • Cancer Mother    • Hypertension Father    • Hyperlipidemia Father    • Cancer Maternal Grandmother         lung ca   • Cancer Maternal Grandfather         lung ca       CURRENT MEDICATIONS  Home Medications     Reviewed by Silva Whitt (Pharmacy Tech) on 09/08/19 at 1656  Med List Status: Complete   Medication Last Dose Status   lisinopril (PRINIVIL) 20 MG Tab 9/7/2019 Active                ALLERGIES  No Known Allergies    PHYSICAL EXAM  VITAL SIGNS: BP (!) 181/90   Pulse (!) 130   Temp 37 °C (98.6 °F) (Temporal)   Resp (!) 24   Ht 1.905 m (6' 3\")   Wt 122.5 kg (270 lb)   SpO2 92%   BMI 33.75 kg/m²   Constitutional: Well developed, Well nourished, No acute distress, Non-toxic appearance.   HENT: Normocephalic, Atraumatic, Bilateral external ears normal, Oropharynx is clear mucous membranes are moist. No oral exudates or nasal discharge.   Eyes: Pupils are equal round and reactive, EOMI, Conjunctiva normal, No discharge.   Neck: Normal range of motion, No tenderness, Supple, No stridor. No meningismus.  Lymphatic: No lymphadenopathy noted.   Cardiovascular: Tachycardic rate and regular rhythm without murmur rub or gallop.  Thorax & Lungs: Moderate respiratory distress on nonrebreather. No rhonchi or rales. There is no chest wall tenderness.   Abdomen: Soft non-tender non-distended. There is no rebound or guarding. No organomegaly is appreciated. Bowel sounds are normal.  Skin: Normal without rash.   Back: No CVA or spinal tenderness.   Extremities: Intact distal pulses, No edema, No tenderness, No cyanosis, No clubbing. Capillary refill is less than 2 seconds.  Musculoskeletal: Good range of motion in all major joints. No tenderness to palpation or " "major deformities noted.   Neurologic: Alert & oriented x 3, Normal motor function, Normal sensory function, No focal deficits noted. Reflexes are normal.  Psychiatric: Affect normal, Judgment normal, Mood normal. There is no suicidal ideation or patient reported hallucinations.       DIAGNOSTIC STUDIES / PROCEDURES    LABS  Labs Reviewed   LACTIC ACID - Abnormal; Notable for the following components:       Result Value    Lactic Acid 3.0 (*)     All other components within normal limits   CBC WITH DIFFERENTIAL - Abnormal; Notable for the following components:    WBC 12.0 (*)     Hematocrit 52.6 (*)     MCHC 33.3 (*)     Neutrophils-Polys 79.60 (*)     Lymphocytes 11.50 (*)     Neutrophils (Absolute) 9.57 (*)     All other components within normal limits   COMP METABOLIC PANEL - Abnormal; Notable for the following components:    Anion Gap 13.0 (*)     Glucose 145 (*)     All other components within normal limits   TROPONIN - Abnormal; Notable for the following components:    Troponin T 245 (*)     All other components within normal limits    Narrative:     Indicate which anticoagulants the patient is on:->NONE   BLOOD CULTURE    Narrative:     Per Hospital Policy: Only change Specimen Src: to \"Line\" if  specified by physician order.   BLOOD CULTURE    Narrative:     Per Hospital Policy: Only change Specimen Src: to \"Line\" if  specified by physician order.   ESTIMATED GFR   APTT    Narrative:     Indicate which anticoagulants the patient is on:->NONE   PROTHROMBIN TIME    Narrative:     Indicate which anticoagulants the patient is on:->NONE   LACTIC ACID   LACTIC ACID   URINALYSIS   URINE CULTURE(NEW)      All labs reviewed by me.    EKG Interpretation:  Results for orders placed or performed during the hospital encounter of 09/08/19   EKG   Result Value Ref Range    Report       [facility] Emergency Dept.    Test Date:  2019-09-08  Pt Name:    BAKARI DORSEY               Department: ER  MRN:     "    [id number]                      Room:       [facility]  Gender:     Male                         Technician: [id number]  :        1975                   Requested By:ER TRIAGE PROTOCOL  Order #:    [id number]                    Reading MD: STACY FRANCIS MD    Measurements  Intervals                                Axis  Rate:       125                          P:          71  KY:         152                          QRS:        21  QRSD:       84                           T:          34  QT:         292  QTc:        421    Interpretive Statements  SINUS TACHYCARDIA  BORDERLINE ST DEPRESSION, LATERAL LEADS  No previous ECG available for comparison    Electronically Signed On 2019 18:23:25 PDT by STACY FRANCIS MD           RADIOLOGY  US-EXTREMITY VENOUS LOWER BILAT   Final Result      EC-ECHOCARDIOGRAM COMPLETE W/O CONT         CT-CTA CHEST PULMONARY ARTERY W/ RECONS   Final Result      1.  Multiple bilateral lobar and segmental pulmonary emboli. The RV LV ratio is elevated at 1.8 consistent with right heart strain.      2.  Fatty change of the liver.      This was discussed with STACY FRANCIS at 5:59 PM on 2019.      DX-CHEST-PORTABLE (1 VIEW)   Final Result      No evidence of acute cardiopulmonary process.        The radiologist's interpretation of all radiological studies have been reviewed by me.    COURSE & MEDICAL DECISION MAKING  Nursing notes, VS, PMSFHx reviewed in chart.    4:50 PM Ordered labs and EKG in triage.  Chest x-ray shows no acute cardiopulmonary abnormality.  I was concerned about possible pulmonary embolism and less concerned about sepsis although he did meet sepsis criteria.    5:15 PM Patient seen and examined at bedside. Ordered for CT-CTA chest pulmonary artery and EC-echocardiogram complete to evaluate. Patient was treated with Rocephin in 2 g in  mL and IV fluids 3,675 mL for his symptoms.      HYDRATION: Based on the patient's presentation of Tachycardia the patient was given IV  fluids. IV Hydration was used because oral hydration was not adequate alone. Upon recheck following hydration, the patient was improved.    5:52 PM Paged Dr. Sorto (Pulmonlogist) at this time.  Laboratory evaluation at this time shows leukocytosis of 12,000 with an 80% PMN shift.  Lactic acid is 3.  No evidence of significant metabolic acidosis or electrolyte derangements and no evidence of liver or renal dysfunction.    6:00 PM Reviewed patient's echocardiogram which show that he has a submassive PE and a right heart strain. He has continued severe hypoxia. He will be given tPA.      6:01 PM Ordered PTT, troponin and PT/INR    6:03 PM I discussed the patient's case and the above findings with Dr. Turner (Hospitalist) who agrees to admit the patient.    6:15 PM I discussed the patient's case and the above findings with Dr. Gill (Pulmonlogist).     6:25 PM Patient was treated with initial Alteplase 10 mg.  This is for submassive dosing for pulmonary embolism    6:26 PM Remaining dose of Alteplase 40 mg will be administered to patient over the next two hours. After the alteplase infusion has stopped, infuse IV fluids 50 mL of normal saline with the same tubing at the same rate as the alteplase infusion to clear the line    6:46 PM Recheck. The patient's tachycardia is starting to improve.  Patient started to be able to breathe better.  We may be able to wean him down on his oxygen if he continues to improve.  Blood pressure remained stable    The total critical care time on this patient is 40 minutes, resuscitating patient, speaking with admitting physician, and deciphering test results. This 40 minutes is exclusive of separately billable procedures.    Patient has had high blood pressure while in the emergency department, felt likely secondary to medical condition. Counseled patient to monitor blood pressure at home and follow up with primary care physician after discharge.     DISPOSITION:  Patient will be admitted  to Dr. Turner (Hospitalist) in guarded condition.  Patient will go to the ICU and intensivist will see him as well.    FINAL IMPRESSION  1. Acute pulmonary embolism with acute cor pulmonale, unspecified pulmonary embolism type (HCC)    2.      The patient received 40 minutes of critical care time      Amarilis HERCULES (Scribe), am scribing for, and in the presence of, Heri Rubio M.D..    Electronically signed by: Amarilis Warner (Scribe), 9/8/2019    Heri HERCULES M.D. personally performed the services described in this documentation, as scribed by Amarilis Warner in my presence, and it is both accurate and complete.    C    The note accurately reflects work and decisions made by me.  Heri Rubio  9/8/2019  10:16 PM

## 2019-09-09 NOTE — PROGRESS NOTES
2 RN skin check complete.   Skin assessed under devices.  Confirmed pressure ulcers found on NA. Skin intact.  The following interventions in place pillows under elbows and other bony prominences, clean linen environment.

## 2019-09-09 NOTE — RESPIRATORY CARE
COPD EDUCATION by COPD CLINICAL EDUCATOR  9/9/2019 at 1:30 PM by Ct Richard     Patient interviewed by COPD education team. Patient refused COPD/Smoking Cessation program at this time.  Patient does chew and will look into tobacco free products.

## 2019-09-09 NOTE — CONSULTS
Critical Care Consultation    Date of consult: 9/8/2019    Referring Physician  Heri Rubio M.D.    Reason for Consultation  Submassive PE    History of Presenting Illness  43 y.o. male who presented 9/8/2019 with PMH obesity, HTN, HPL admitted for progressive SOB over the past 3 days. Recently had 8 hour car ride from [employer] 2 weeks ago. He subsequently had left calf pain and edema and continues to get worse. He has SOB in the past 3 days with intermittent chest pain.     At ED, Pt was tachypenic and was immediately placed on NRB mask 15L/min.  CTA chest showed multiple bilateral lobar and segmental PE. RV/LV ratio 1.8 c/w RV heart strain. -150s, HR in 119, afebrile. Creatinine 1.27. Troponin 245. Lactate 3.0. Pt received systemic tpA therapy, 10mg bolus followed with 40mg x2 hours    Code Status  Full Code    Review of Systems  Review of Systems   Constitutional: Negative for chills, fever and malaise/fatigue.   HENT: Negative for congestion and sinus pain.    Respiratory: Negative for cough, sputum production and shortness of breath.    Cardiovascular: Negative for chest pain, palpitations, orthopnea and leg swelling.   Gastrointestinal: Negative for abdominal pain, diarrhea, nausea and vomiting.   Genitourinary: Negative for dysuria and urgency.   Musculoskeletal: Negative for back pain, falls and joint pain.   Skin: Negative for rash.   Neurological: Negative for seizures, weakness and headaches.   Psychiatric/Behavioral: The patient is not nervous/anxious.        Past Medical History   has a past medical history of Hypertension and Obesity, unspecified.    Surgical History   has a past surgical history that includes appendectomy (2005) and fasciotomy (Right, 9/16/2016).    Family History  family history includes Cancer in his maternal grandfather, maternal grandmother, and mother; Hyperlipidemia in his father; Hypertension in his father.    Social History   reports that he has never smoked. His  smokeless tobacco use includes chew. He reports that he drinks about 3.0 oz of alcohol per week. He reports that he does not use drugs.    Medications  Home Medications     Reviewed by Silva Whitt (Pharmacy [facility]) on 09/08/19 at 1656  Med List Status: Complete   Medication Last Dose Status   lisinopril (PRINIVIL) 20 MG Tab 9/7/2019 Active              Current Facility-Administered Medications   Medication Dose Route Frequency Provider Last Rate Last Dose   • [START ON 9/9/2019] senna-docusate (PERICOLACE or SENOKOT S) 8.6-50 MG per tablet 2 Tab  2 Tab Oral BID Diann Turner M.D.        And   • polyethylene glycol/lytes (MIRALAX) PACKET 1 Packet  1 Packet Oral QDAY PRN Diann Turner M.D.        And   • magnesium hydroxide (MILK OF MAGNESIA) suspension 30 mL  30 mL Oral QDAY PRN Diann Turner M.D.        And   • bisacodyl (DULCOLAX) suppository 10 mg  10 mg Rectal QDAY PRN Diann Turner M.D.       • Respiratory Care per Protocol   Nebulization Continuous RT Diann Turner M.D.       • ondansetron (ZOFRAN) syringe/vial injection 4 mg  4 mg Intravenous Q4HRS PRHIEU Turner M.D.       • ondansetron (ZOFRAN ODT) dispertab 4 mg  4 mg Oral Q4HRS PRHIEU Turner M.D.       • promethazine (PHENERGAN) tablet 12.5-25 mg  12.5-25 mg Oral Q4HRS PRHIEU Turner M.D.       • promethazine (PHENERGAN) suppository 12.5-25 mg  12.5-25 mg Rectal Q4HRS PRHIEU Turner M.D.       • prochlorperazine (COMPAZINE) injection 5-10 mg  5-10 mg Intravenous Q4HRS PRHIEU Turner M.D.       • alteplase (ACTIVASE) SOLR 40 mg  40 mg Intravenous Once Heri Rubio M.D.   40 mg at 09/08/19 1833    Followed by   • NS infusion   Intravenous Once Heri Rubio M.D.       • heparin injection 4,000 Units  4,000 Units Intravenous PRN Diann Turner M.D.        And   • heparin infusion 25,000 units in 500 mL 0.45% NACL   Intravenous Continuous Diann Turner M.D.         Current Outpatient  Medications   Medication Sig Dispense Refill   • lisinopril (PRINIVIL) 20 MG Tab Take 20 mg by mouth every evening.         Allergies  No Known Allergies    Vital Signs last 24 hours  Temp:  [37 °C (98.6 °F)] 37 °C (98.6 °F)  Pulse:  [112-130] 113  Resp:  [14-26] 14  BP: (131-181)/() 142/123  SpO2:  [91 %-93 %] 93 %    Physical Exam  Physical Exam   Constitutional: He is oriented to person, place, and time. No distress.   HENT:   Head: Normocephalic and atraumatic.   Mouth/Throat: Oropharynx is clear and moist.   Eyes: Conjunctivae are normal. No scleral icterus.   Neck: Neck supple.   Cardiovascular: Normal rate, regular rhythm, normal heart sounds and intact distal pulses.   No murmur heard.  Pulmonary/Chest: Effort normal and breath sounds normal. No stridor. No respiratory distress. He has no wheezes. He has no rales.   Abdominal: Soft. Bowel sounds are normal. He exhibits no distension. There is no tenderness. There is no rebound and no guarding.   Musculoskeletal: Normal range of motion. He exhibits no edema or deformity.   Neurological: He is alert and oriented to person, place, and time.   Skin: Skin is warm. No rash noted. He is not diaphoretic. No erythema.   Psychiatric: He has a normal mood and affect. His behavior is normal. Thought content normal.   Nursing note and vitals reviewed.      Fluids  No intake or output data in the 24 hours ending 19 1854    Laboratory  Recent Results (from the past 48 hour(s))   EKG    Collection Time: 19  4:40 PM   Result Value Ref Range    Report       [facility] Emergency Dept.    Test Date:  2019  Pt Name:    BAKARI DORSEY               Department: ER  MRN:        [id number]                      Room:        08  Gender:     Male                         Technician: [id number]  :        1975                   Requested By:ER TRIAGE PROTOCOL  Order #:    [id number]                    Kevin MD: STACY FRANCIS,  MD    Measurements  Intervals                                Axis  Rate:       125                          P:          71  CA:         152                          QRS:        21  QRSD:       84                           T:          34  QT:         292  QTc:        421    Interpretive Statements  SINUS TACHYCARDIA  BORDERLINE ST DEPRESSION, LATERAL LEADS  No previous ECG available for comparison    Electronically Signed On 9-8-2019 18:23:25 PDT by STACY FRANCIS MD     Lactic acid (lactate)    Collection Time: 09/08/19  4:50 PM   Result Value Ref Range    Lactic Acid 3.0 (H) 0.5 - 2.0 mmol/L   CBC WITH DIFFERENTIAL    Collection Time: 09/08/19  4:50 PM   Result Value Ref Range    WBC 12.0 (H) 4.8 - 10.8 K/uL    RBC 5.69 4.70 - 6.10 M/uL    Hemoglobin 17.5 14.0 - 18.0 g/dL    Hematocrit 52.6 (H) 42.0 - 52.0 %    MCV 92.4 81.4 - 97.8 fL    MCH 30.8 27.0 - 33.0 pg    MCHC 33.3 (L) 33.7 - 35.3 g/dL    RDW 41.9 35.9 - 50.0 fL    Platelet Count 207 164 - 446 K/uL    MPV 10.8 9.0 - 12.9 fL    Neutrophils-Polys 79.60 (H) 44.00 - 72.00 %    Lymphocytes 11.50 (L) 22.00 - 41.00 %    Monocytes 6.40 0.00 - 13.40 %    Eosinophils 1.70 0.00 - 6.90 %    Basophils 0.40 0.00 - 1.80 %    Immature Granulocytes 0.40 0.00 - 0.90 %    Nucleated RBC 0.00 /100 WBC    Neutrophils (Absolute) 9.57 (H) 1.82 - 7.42 K/uL    Lymphs (Absolute) 1.39 1.00 - 4.80 K/uL    Monos (Absolute) 0.77 0.00 - 0.85 K/uL    Eos (Absolute) 0.21 0.00 - 0.51 K/uL    Baso (Absolute) 0.05 0.00 - 0.12 K/uL    Immature Granulocytes (abs) 0.05 0.00 - 0.11 K/uL    NRBC (Absolute) 0.00 K/uL   COMP METABOLIC PANEL    Collection Time: 09/08/19  4:50 PM   Result Value Ref Range    Sodium 138 135 - 145 mmol/L    Potassium 3.8 3.6 - 5.5 mmol/L    Chloride 102 96 - 112 mmol/L    Co2 23 20 - 33 mmol/L    Anion Gap 13.0 (H) 0.0 - 11.9    Glucose 145 (H) 65 - 99 mg/dL    Bun 13 8 - 22 mg/dL    Creatinine 1.27 0.50 - 1.40 mg/dL    Calcium 9.1 8.5 - 10.5 mg/dL    AST(SGOT) 21 12 - 45  U/L    ALT(SGPT) 27 2 - 50 U/L    Alkaline Phosphatase 68 30 - 99 U/L    Total Bilirubin 0.8 0.1 - 1.5 mg/dL    Albumin 4.4 3.2 - 4.9 g/dL    Total Protein 7.7 6.0 - 8.2 g/dL    Globulin 3.3 1.9 - 3.5 g/dL    A-G Ratio 1.3 g/dL   ESTIMATED GFR    Collection Time: 09/08/19  4:50 PM   Result Value Ref Range    GFR If African American >60 >60 mL/min/1.73 m 2    GFR If Non African American >60 >60 mL/min/1.73 m 2   PTT    Collection Time: 09/08/19  4:50 PM   Result Value Ref Range    APTT 30.7 24.7 - 36.0 sec   PT/INR    Collection Time: 09/08/19  4:50 PM   Result Value Ref Range    PT 13.7 12.0 - 14.6 sec    INR 1.03 0.87 - 1.13   TROPONIN    Collection Time: 09/08/19  4:50 PM   Result Value Ref Range    Troponin T 245 (H) 6 - 19 ng/L       Imaging  EC-ECHOCARDIOGRAM COMPLETE W/O CONT         CT-CTA CHEST PULMONARY ARTERY W/ RECONS   Final Result      1.  Multiple bilateral lobar and segmental pulmonary emboli. The RV LV ratio is elevated at 1.8 consistent with right heart strain.      2.  Fatty change of the liver.      This was discussed with STACY FRANCIS at 5:59 PM on 9/8/2019.      DX-CHEST-PORTABLE (1 VIEW)   Final Result      No evidence of acute cardiopulmonary process.      US-EXTREMITY VENOUS LOWER BILAT    (Results Pending)       Assessment/Plan  * Acute pulmonary embolism (HCC)  Assessment & Plan  Provoked PE - from long travel. No known hx of malignancy and no family hx of blood clot.    Submassive PE with right heart strain. RV/LV ratio 1.8:1  Pt received systemic tpa therapy  Start heparin gtt per PE Protocol.     US LE bilateral tomorrow to assess for DVT  Follow troponin levels  Check BNP  Check hypercoagulable state panel        Elevated troponin  Assessment & Plan  Likely due to RV strain.  ECG with borderline ST depressions in lateral precordial leads and anterolateral leads.   Doubt ACS    Serial troponins and trend it  Repeat ECG tomorrow    Lactic acidosis  Assessment & Plan  From submassive  PE  Repeat lactate tomorrow to trend pattern.     Acute hypoxemic respiratory failure (HCC)  Assessment & Plan  From submassive PE, requiring NRB mask 15L/min  Now on high flow    HTN (hypertension)  Assessment & Plan  Keep SBP <140/90  Start lisinopril 20mg daily   Hydralazine IV prn    I have assessed his respiratory status, blood pressure, hemodynamics, cardiovascular and neurological status.  He is at increased risk for worsening respiratory, cardiovascular and neurological system dysfunction. Submassive PE with evidence of acute RV strain, elevated troponin and BNPs. Need close monitoring of his hemodynamics. He is at significant risk for requiring intubation mechanical ventilatory support.  High risk of deterioration and worsening vital organ dysfunction and death without the above critical care interventions.     Discussed patient condition and risk of morbidity and/or mortality with RN and Patient.    The patient remains critically ill.  Critical care time = 45 minutes in directly providing and coordinating critical care and extensive data review.  No time overlap and excludes procedures.

## 2019-09-09 NOTE — ASSESSMENT & PLAN NOTE
From submassive PE  RT/O2 protocols  Continue to titrate O2 to maintain saturation greater than 92%

## 2019-09-09 NOTE — ASSESSMENT & PLAN NOTE
Continue active treatment with lisinopril 20 mg daily  Monitor vitals  Encouraged weight loss and dietary changes

## 2019-09-09 NOTE — PROGRESS NOTES
Hospital Medicine Daily Progress Note    Date of Service  9/9/2019    Chief Complaint  Short of breath    Hospital Course    43 y.o. Obese male with DLD, HTN admitted 9/8/2019 with pulmonary embolism after a car trip. He had right heart strain and TPA was given       Interval Problem Update  Alert without any difficulties speech.  Clear fluent sentences  Denies any bleeding  No pleuritic chest pain able to take full deep breaths  Has some swelling in the left leg he states.  But no pain  We discussed the importance of medical compliance with his anticoagulation.  I alerted him to medications to avoid while on Xarelto  I have met with the  to make sure patient could afford Xarelto  Okay to transfer from ICU to telemetry    Consultants/Specialty  Intensivist    Code Status  FULL    Disposition  Transfer to telemetry    Review of Systems  Review of Systems   Constitutional: Negative for chills, diaphoresis, fever, malaise/fatigue and weight loss.   Respiratory: Negative for hemoptysis, shortness of breath and stridor.    Cardiovascular: Positive for leg swelling. Negative for chest pain and palpitations.   Gastrointestinal: Negative for abdominal pain, blood in stool and nausea.   Genitourinary: Negative for hematuria.   Musculoskeletal: Negative for myalgias.   Neurological: Negative for dizziness, speech change and headaches.   Psychiatric/Behavioral: The patient is not nervous/anxious.         Physical Exam  Temp:  [35.9 °C (96.6 °F)-37 °C (98.6 °F)] 35.9 °C (96.6 °F)  Pulse:  [] 71  Resp:  [10-26] 15  BP: (117-181)/() 124/100  SpO2:  [90 %-99 %] 90 %    Physical Exam   Constitutional: He is oriented to person, place, and time. He appears well-developed. No distress.   HENT:   Head: Normocephalic and atraumatic.   Nose: Nose normal.   Mouth/Throat: No oropharyngeal exudate.   Eyes: Conjunctivae and EOM are normal. Right eye exhibits no discharge. Left eye exhibits no discharge.   Neck: No  tracheal deviation present.   Cardiovascular: Normal rate, regular rhythm and intact distal pulses.   No murmur heard.  Pulmonary/Chest: Effort normal and breath sounds normal. No stridor. No respiratory distress. He has no wheezes.   Abdominal: Bowel sounds are normal. He exhibits no distension. There is no tenderness.   Musculoskeletal: He exhibits no edema.   Neurological: He is alert and oriented to person, place, and time. No cranial nerve deficit.   Skin: Skin is warm. He is not diaphoretic.   Psychiatric: He has a normal mood and affect. His behavior is normal. Thought content normal.   Vitals reviewed.      Fluids    Intake/Output Summary (Last 24 hours) at 9/9/2019 1500  Last data filed at 9/9/2019 1400  Gross per 24 hour   Intake 2025.6 ml   Output 1350 ml   Net 675.6 ml       Laboratory  Recent Labs     09/08/19  1650 09/09/19  0210   WBC 12.0* 10.0   RBC 5.69 5.02   HEMOGLOBIN 17.5 15.4   HEMATOCRIT 52.6* 46.5   MCV 92.4 92.6   MCH 30.8 30.7   MCHC 33.3* 33.1*   RDW 41.9 42.5   PLATELETCT 207 192   MPV 10.8 11.1     Recent Labs     09/08/19  1650 09/09/19  0210   SODIUM 138 140   POTASSIUM 3.8 4.3   CHLORIDE 102 107   CO2 23 22   GLUCOSE 145* 125*   BUN 13 12   CREATININE 1.27 1.22   CALCIUM 9.1 8.4*     Recent Labs     09/08/19  1650 09/09/19  0210 09/09/19  0848   APTT 30.7 43.3* 48.2*   INR 1.03  --   --                Imaging  US-EXTREMITY VENOUS LOWER BILAT   Final Result      EC-ECHOCARDIOGRAM COMPLETE W/O CONT   Final Result      CT-CTA CHEST PULMONARY ARTERY W/ RECONS   Final Result      1.  Multiple bilateral lobar and segmental pulmonary emboli. The RV LV ratio is elevated at 1.8 consistent with right heart strain.      2.  Fatty change of the liver.      This was discussed with STACY FRANCIS at 5:59 PM on 9/8/2019.      DX-CHEST-PORTABLE (1 VIEW)   Final Result      No evidence of acute cardiopulmonary process.           Assessment/Plan  * Acute pulmonary embolism (HCC)  Assessment &  Plan  Multiple Bilateral PE noted on CT   There is concerns of right heart strain and patient was transferred to [facility] for consideration of TPA.  TPA was given and he is being monitored in the ICU.  Heparin drip to be initiated to Xarelto.  Reviewed with  9/9 patient apparently has insurance to cover for Xarelto  9/8 DVT studies  Extensive LEFT lower extremity DVT with 'floating tail' No RIGHT lower extremity DVT  Echocardiogram with reserved ejection fraction no significant right heart strain as of 9/8 p.m.  Suspect provoked given long car ride from [place].  No constitutional symptoms otherwise.  Consider coagulopathy workup status post coagulation in the future    Elevated troponin  Assessment & Plan  Due to demand from right heart strain and PE   Cont serial troponin   Cardiac monitoring     Lactic acidosis  Assessment & Plan  Suspect due to hypoxia   Not sepsis   Decrease in lactic acids go ahead and discontinue lactic acid serum check    Sinus tachycardia  Assessment & Plan  Due to PE, cont cardiac monitoring     Leukocytosis  Assessment & Plan  Reactive, cont to montior   Resolved 9/9 WBC 10    Acute hypoxemic respiratory failure (HCC)  Assessment & Plan  Due to large PE status post TPA this admission  Wean O2 as tolerated evaluate for any home O2 needs.  RT protocol as needed    HTN (hypertension)  Assessment & Plan  Continue active treatment with lisinopril 20 mg daily  Monitor vitals  Encouraged weight loss and dietary changes    Dyslipidemia- (present on admission)  Assessment & Plan  Hx of not on statin therapy   9/9 discussion on weight loss and increase activity in the near future discussed       VTE prophylaxis: Heparin drip

## 2019-09-09 NOTE — ASSESSMENT & PLAN NOTE
Due to large PE status post TPA this admission  Wean O2 as tolerated evaluate for any home O2 needs.  RT protocol as needed

## 2019-09-09 NOTE — PROGRESS NOTES
Critical Care Progress Note    Date of admission  9/8/2019    Chief Complaint  43 y.o. male admitted 9/8/2019 with submassive PE      Hospital Course    43 y.o. male who presented 9/8/2019 with PMH obesity, HTN, HPL admitted for progressive SOB over the past 3 days. Recently had 8 hour car ride from [place] 2 weeks ago. He subsequently had left calf pain and edema and continues to get worse. He has SOB in the past 3 days with intermittent chest pain.      At ED, Pt was tachypenic and was immediately placed on NRB mask 15L/min.  CTA chest showed multiple bilateral lobar and segmental PE. RV/LV ratio 1.8 c/w RV heart strain. -150s, HR in 119, afebrile. Creatinine 1.27. Troponin 245. Lactate 3.0. Pt received systemic tpA therapy, 10mg bolus followed with 40mg x2 hours      Interval Problem Update  Reviewed last 24 hour events:  T-max 98.6  -82 cc over last 24 hours  LE Doppler 9/8 positive LLE DVT  Echo 9/18 EF 55%, dilated RV  Status post systemic TPA  Labs reviewed    Heparin infusion    96% on 4 L  Last BM PTA    Review of Systems  Review of Systems   Constitutional: Negative for chills and fever.   HENT: Negative for congestion.    Eyes: Negative for blurred vision and double vision.   Respiratory: Negative for cough and hemoptysis.    Cardiovascular: Negative for chest pain and palpitations.   Gastrointestinal: Negative for nausea and vomiting.   Genitourinary: Negative for dysuria.   Neurological: Negative for dizziness and focal weakness.   Psychiatric/Behavioral: Negative for depression.   All other systems reviewed and are negative.       Vital Signs for last 24 hours   Temp:  [36.2 °C (97.2 °F)-37 °C (98.6 °F)] 36.4 °C (97.5 °F)  Pulse:  [] 77  Resp:  [10-26] 16  BP: (117-181)/() 134/96  SpO2:  [90 %-99 %] 96 %    Hemodynamic parameters for last 24 hours       Respiratory Information for the last 24 hours       Physical Exam   Physical Exam   Constitutional: He appears well-developed and  well-nourished.   HENT:   Head: Normocephalic and atraumatic.   Eyes: Pupils are equal, round, and reactive to light. Conjunctivae are normal.   Neck: No tracheal deviation present.   Cardiovascular: Normal rate and intact distal pulses.   Pulmonary/Chest: He has no wheezes. He has no rales.   Abdominal: Soft. He exhibits no distension. There is no tenderness.   Musculoskeletal: He exhibits no edema.   Neurological: No cranial nerve deficit.   Skin: Skin is warm and dry.   Psychiatric: He has a normal mood and affect.   Nursing note and vitals reviewed.      Medications  Current Facility-Administered Medications   Medication Dose Route Frequency Provider Last Rate Last Dose   • senna-docusate (PERICOLACE or SENOKOT S) 8.6-50 MG per tablet 2 Tab  2 Tab Oral BID Diann Turner M.D.        And   • polyethylene glycol/lytes (MIRALAX) PACKET 1 Packet  1 Packet Oral QDAY PRN Diann Turner M.D.        And   • magnesium hydroxide (MILK OF MAGNESIA) suspension 30 mL  30 mL Oral QDAY PRN Diann Turner M.D.        And   • bisacodyl (DULCOLAX) suppository 10 mg  10 mg Rectal QDAY PRN Diann Turner M.D.       • Respiratory Care per Protocol   Nebulization Continuous RT Diann Turner M.D.       • ondansetron (ZOFRAN) syringe/vial injection 4 mg  4 mg Intravenous Q4HRS PRHIEU Turner M.D.       • ondansetron (ZOFRAN ODT) dispertab 4 mg  4 mg Oral Q4HRS PRN Diann Turner M.D.       • promethazine (PHENERGAN) tablet 12.5-25 mg  12.5-25 mg Oral Q4HRS PRN Diann Turner M.D.       • promethazine (PHENERGAN) suppository 12.5-25 mg  12.5-25 mg Rectal Q4HRS PRHIEU Turner M.D.       • prochlorperazine (COMPAZINE) injection 5-10 mg  5-10 mg Intravenous Q4HRS PRHIEU Turner M.D.       • heparin injection 4,000 Units  4,000 Units Intravenous PRN Diann Turner M.D.   4,000 Units at 09/09/19 0251    And   • heparin infusion 25,000 units in 500 mL 0.45% NACL   Intravenous Continuous Diann SALMERON  FRANKLYN Turner 22 mL/hr at 09/09/19 0252 1,100 Units/hr at 09/09/19 0252   • morphine (pf) 4 mg/ml injection 2 mg  2 mg Intravenous Q2HRS PRN Diann Turner M.D.       • hydrALAZINE (APRESOLINE) injection 10 mg  10 mg Intravenous Q6HRS PRN Olivier Newton D.O.   10 mg at 09/08/19 2038   • lisinopril (PRINIVIL) tablet 20 mg  20 mg Oral Q DAY Diann Turner M.D.           Fluids    Intake/Output Summary (Last 24 hours) at 9/9/2019 0622  Last data filed at 9/9/2019 0400  Gross per 24 hour   Intake 1097.6 ml   Output 1150 ml   Net -52.4 ml       Laboratory          Recent Labs     09/08/19  1650 09/09/19  0210   SODIUM 138 140   POTASSIUM 3.8 4.3   CHLORIDE 102 107   CO2 23 22   BUN 13 12   CREATININE 1.27 1.22   CALCIUM 9.1 8.4*     Recent Labs     09/08/19  1650 09/09/19  0210   ALTSGPT 27 21   ASTSGOT 21 28   ALKPHOSPHAT 68 56   TBILIRUBIN 0.8 0.7   GLUCOSE 145* 125*     Recent Labs     09/08/19  1650 09/09/19  0210   WBC 12.0* 10.0   NEUTSPOLYS 79.60* 68.70   LYMPHOCYTES 11.50* 21.00*   MONOCYTES 6.40 8.50   EOSINOPHILS 1.70 1.10   BASOPHILS 0.40 0.30   ASTSGOT 21 28   ALTSGPT 27 21   ALKPHOSPHAT 68 56   TBILIRUBIN 0.8 0.7     Recent Labs     09/08/19  1650 09/09/19  0210   RBC 5.69 5.02   HEMOGLOBIN 17.5 15.4   HEMATOCRIT 52.6* 46.5   PLATELETCT 207 192   PROTHROMBTM 13.7  --    APTT 30.7 43.3*   INR 1.03  --        Imaging  Echo:   Reviewed    Assessment/Plan  * Acute pulmonary embolism (HCC)  Assessment & Plan  Provoked PE - from long travel.  No known malignancy, there is possible family history with father having DVT    Submassive PE with right heart strain. RV/LV ratio 1.8:1  Pt received systemic tpa therapy  Continue heparin infusion with active titration  Ultrasound with positive DVT  Follow-up hypercoagulable panel  Transition to oral anticoagulant    Elevated troponin  Assessment & Plan  Related to RV strain from PE    Lactic acidosis  Assessment & Plan  Improving    Acute hypoxemic respiratory failure  (HCC)  Assessment & Plan  From submassive PE  RT/O2 protocols  Continue to titrate O2 to maintain saturation greater than 92%    HTN (hypertension)  Assessment & Plan  Keep SBP <140/90  Continue lisinopril  Hydralazine IV prn       VTE:  Heparin  Ulcer: Not Indicated  Lines: None    I have performed a physical exam and reviewed and updated ROS and Plan today (9/9/2019). In review of yesterday's note (9/8/2019), there are no changes except as documented above.     Discussed patient condition and risk of morbidity and/or mortality with Hospitalist, Family, RN, RT, Pharmacy and Patient

## 2019-09-09 NOTE — H&P
Hospital Medicine History & Physical Note    Date of Service  9/8/2019    Primary Care Physician  Nikki Wright M.D.    Consultants  PMA    Code Status  full    Chief Complaint  Shortness of breath     History of Presenting Illness  43 y.o. male who presented 9/8/2019 with past medical history of obesity, hyperlipidemia, hypertension who presents with shortness of breath.  This patient had a progressively worsening shortness of breath over the last several days.  He recently had an 8-hour car ride from [place] about 2 weeks ago.  He then subsequently had left calf pain and swelling upon his return to [place] however this has subsided.  Now he is developed worsening shortness of breath over the last several days and acutely worsened today.  He is also had intermittent chest pain.  He does have a previous history of superficial thrombophlebitis otherwise no family history of blood clots.  In the emergency department is found to have pulmonary embolism with evidence of acute right heart strain and will be admitted to the ICU status post TPA.     Review of Systems  Review of Systems   Constitutional: Negative for chills and fever.   HENT: Negative for congestion, hearing loss and tinnitus.    Eyes: Negative for blurred vision, double vision and discharge.   Respiratory: Positive for shortness of breath. Negative for cough and hemoptysis.    Cardiovascular: Positive for chest pain and palpitations. Negative for leg swelling.   Gastrointestinal: Negative for abdominal pain, heartburn, nausea and vomiting.   Genitourinary: Negative for dysuria and flank pain.   Musculoskeletal: Negative for joint pain and myalgias.   Skin: Negative for rash.   Neurological: Negative for dizziness, sensory change, speech change, focal weakness and weakness.   Endo/Heme/Allergies: Negative for environmental allergies. Does not bruise/bleed easily.   Psychiatric/Behavioral: Negative for depression, hallucinations and substance abuse.        Past Medical History   has a past medical history of Hypertension and Obesity, unspecified.    Surgical History   has a past surgical history that includes appendectomy (2005) and fasciotomy (Right, 9/16/2016).     Family History  family history includes Cancer in his maternal grandfather, maternal grandmother, and mother; Hyperlipidemia in his father; Hypertension in his father.     Social History   reports that he has never smoked. His smokeless tobacco use includes chew. He reports that he drinks about 3.0 oz of alcohol per week. He reports that he does not use drugs.    Allergies  No Known Allergies    Medications  Prior to Admission Medications   Prescriptions Last Dose Informant Patient Reported? Taking?   lisinopril (PRINIVIL) 20 MG Tab 9/7/2019 at 1900 Patient Yes No   Sig: Take 20 mg by mouth every evening.      Facility-Administered Medications: None       Physical Exam  Temp:  [37 °C (98.6 °F)] 37 °C (98.6 °F)  Pulse:  [130] 130  Resp:  [24] 24  BP: (181)/(90) 181/90  SpO2:  [92 %] 92 %    Physical Exam   Constitutional: He is oriented to person, place, and time. He appears well-developed and well-nourished.   HENT:   Head: Normocephalic and atraumatic.   Eyes: Pupils are equal, round, and reactive to light. Conjunctivae and EOM are normal.   Neck: Normal range of motion. Neck supple. No JVD present.   Cardiovascular: Regular rhythm, normal heart sounds and intact distal pulses.   No murmur heard.  Tachycardia    Pulmonary/Chest: Breath sounds normal. He is in respiratory distress. He exhibits no tenderness.   Abdominal: Soft. Bowel sounds are normal. He exhibits no distension. There is no tenderness.   Musculoskeletal: Normal range of motion. He exhibits no edema.   Neurological: He is alert and oriented to person, place, and time. No cranial nerve deficit. He exhibits normal muscle tone.   Skin: Skin is warm and dry. No erythema.   Psychiatric: He has a normal mood and affect. His behavior is  normal. Judgment and thought content normal.   Nursing note and vitals reviewed.      Laboratory:  Recent Labs     09/08/19  1650   WBC 12.0*   RBC 5.69   HEMOGLOBIN 17.5   HEMATOCRIT 52.6*   MCV 92.4   MCH 30.8   MCHC 33.3*   RDW 41.9   PLATELETCT 207   MPV 10.8     Recent Labs     09/08/19  1650   SODIUM 138   POTASSIUM 3.8   CHLORIDE 102   CO2 23   GLUCOSE 145*   BUN 13   CREATININE 1.27   CALCIUM 9.1     Recent Labs     09/08/19  1650   ALTSGPT 27   ASTSGOT 21   ALKPHOSPHAT 68   TBILIRUBIN 0.8   GLUCOSE 145*         No results for input(s): NTPROBNP in the last 72 hours.      No results for input(s): TROPONINT in the last 72 hours.    Urinalysis:    No results found     Imaging:  CT-CTA CHEST PULMONARY ARTERY W/ RECONS   Final Result      1.  Multiple bilateral lobar and segmental pulmonary emboli. The RV LV ratio is elevated at 1.8 consistent with right heart strain.      2.  Fatty change of the liver.      This was discussed with STACY FRANCIS at 5:59 PM on 9/8/2019.      DX-CHEST-PORTABLE (1 VIEW)   Final Result      No evidence of acute cardiopulmonary process.      EC-ECHOCARDIOGRAM COMPLETE W/O CONT    (Results Pending)   US-EXTREMITY VENOUS LOWER BILAT    (Results Pending)         Assessment/Plan:  I anticipate this patient will require at least two midnights for appropriate medical management, necessitating inpatient admission.    * Acute pulmonary embolism with acute cor pulmonale (HCC)  Assessment & Plan  Multiple Bilateral PE noted on CT   Evidence of right heart strain, elevated troponin   Plan for TPA, post TPA heparin ggt   Echocardiogram ordered  DVT studies   Suspect provoked given long car ride from [place]   Consider coagulopathy workup   Pain control, wean 02 as tolerated    Sinus tachycardia  Assessment & Plan  Due to PE, cont cardiac monitoring     Leukocytosis  Assessment & Plan  Reactive, cont to montior     Acute hypoxemic respiratory failure (HCC)  Assessment & Plan  Due to large PE    Wean 02 as tolerated     Elevated troponin  Assessment & Plan  Due to demand from right heart strain and PE   Cont serial troponin   Cardiac monitoring     Lactic acidosis  Assessment & Plan  Suspect due to hypoxia   Not sepsis   Repeat lactic in 4 hours    HTN (hypertension)  Assessment & Plan  Resume home ace as appropriate     Dyslipidemia- (present on admission)  Assessment & Plan  Hx of not on statin therapy       VTE prophylaxis: heparin

## 2019-09-09 NOTE — ASSESSMENT & PLAN NOTE
Provoked PE - from long travel.  No known malignancy, there is possible family history with father having DVT    Submassive PE with right heart strain. RV/LV ratio 1.8:1  Pt received systemic tpa therapy  Continue heparin infusion with active titration  Ultrasound with positive DVT  Follow-up hypercoagulable panel  Transition to oral anticoagulant

## 2019-09-09 NOTE — PROGRESS NOTES
Patient transported up to room from ER with RN. Patient placed on all monitors and assessed, drips verified. Patient updated on plan of care and verbalizes understanding.

## 2019-09-09 NOTE — PROGRESS NOTES
Received bedside report from PM nurse. Assumed patient care. Chart reviewed. Pt was resting in bed. A&O x 4. No concerns, complaints or distress. Patient denies pain.  SR 80's, . 4L of O2, denies SOB.   Heparin drip.    POC updated with pt and on the patient communication board. Bed locked and in the lowest position. Call light within reach.  Will continue to monitor.

## 2019-09-10 ENCOUNTER — PATIENT OUTREACH (OUTPATIENT)
Dept: HEALTH INFORMATION MANAGEMENT | Facility: OTHER | Age: 44
End: 2019-09-10

## 2019-09-10 VITALS
SYSTOLIC BLOOD PRESSURE: 134 MMHG | HEIGHT: 75 IN | RESPIRATION RATE: 16 BRPM | OXYGEN SATURATION: 94 % | TEMPERATURE: 97.6 F | WEIGHT: 267.42 LBS | HEART RATE: 85 BPM | DIASTOLIC BLOOD PRESSURE: 78 MMHG | BODY MASS INDEX: 33.25 KG/M2

## 2019-09-10 PROBLEM — E87.20 LACTIC ACIDOSIS: Status: RESOLVED | Noted: 2019-09-08 | Resolved: 2019-09-10

## 2019-09-10 PROBLEM — R00.0 SINUS TACHYCARDIA: Status: RESOLVED | Noted: 2019-09-08 | Resolved: 2019-09-10

## 2019-09-10 PROBLEM — D72.829 LEUKOCYTOSIS: Status: RESOLVED | Noted: 2019-09-08 | Resolved: 2019-09-10

## 2019-09-10 PROBLEM — R79.89 ELEVATED TROPONIN: Status: RESOLVED | Noted: 2019-09-08 | Resolved: 2019-09-10

## 2019-09-10 PROBLEM — J96.01 ACUTE HYPOXEMIC RESPIRATORY FAILURE (HCC): Status: RESOLVED | Noted: 2019-09-08 | Resolved: 2019-09-10

## 2019-09-10 LAB
APTT PPP: 34.8 SEC (ref 24.7–36)
APTT PPP: 36.4 SEC (ref 24.7–36)

## 2019-09-10 PROCEDURE — 99239 HOSP IP/OBS DSCHRG MGMT >30: CPT | Performed by: INTERNAL MEDICINE

## 2019-09-10 PROCEDURE — 700111 HCHG RX REV CODE 636 W/ 250 OVERRIDE (IP): Performed by: INTERNAL MEDICINE

## 2019-09-10 PROCEDURE — 85730 THROMBOPLASTIN TIME PARTIAL: CPT

## 2019-09-10 PROCEDURE — 700102 HCHG RX REV CODE 250 W/ 637 OVERRIDE(OP): Performed by: HOSPITALIST

## 2019-09-10 PROCEDURE — A9270 NON-COVERED ITEM OR SERVICE: HCPCS | Performed by: HOSPITALIST

## 2019-09-10 PROCEDURE — 36415 COLL VENOUS BLD VENIPUNCTURE: CPT

## 2019-09-10 RX ADMIN — HYDRALAZINE HYDROCHLORIDE 10 MG: 20 INJECTION INTRAMUSCULAR; INTRAVENOUS at 09:33

## 2019-09-10 RX ADMIN — RIVAROXABAN 15 MG: 15 TABLET, FILM COATED ORAL at 05:07

## 2019-09-10 NOTE — DISCHARGE SUMMARY
Discharge Summary    CHIEF COMPLAINT ON ADMISSION  Chief Complaint   Patient presents with   • Shortness of Breath     for several days. Much worse this afternoon.        Reason for Admission  EMS     Admission Date  9/8/2019    CODE STATUS  Full Code    HPI & HOSPITAL COURSE  This is a 43 y.o. Obese male with DLD, HTN, and hyperlipidemia, admitted 9/8/2019 with shortness of breath after he had a long drive to [place].  He was found to have multiple bilateral lobar, and segment pulmonary embolism, with right heart strain.  Echocardiogram showed preserved ejection fraction with no significant right heart strain.  Troponin was 245.  He had initial lactic acidosis.  He received TPA for thrombolysis.  He was also noted to have extensive left lower extremity DVT, with no right lower extremity DVT.  He is placed on heparin infusion.  He has been transitioned to Xarelto.  Insurance coverage for Xarelto was confirmed.      He has clinically improved. He remained hemodynamically stable and afebrile. He has been weaned off successfully from oxygen.  He is doing well on room air.  He had no evidence of bleeding.    I have personally seen and examined the patient on the day of discharge. With his clinical improvement, he was deemed ready to discharge from the hospital as he did not have any further hospitalization needs. Patient felt comfortable going home. The discharge plan was discussed with the patient, with which he was agreeable to.     Therefore, he is discharged in good and stable condition to home with close outpatient follow-up.    The patient met 2-midnight criteria for an inpatient stay at the time of discharge.    Discharge Date  9/10/2019      FOLLOW UP ITEMS POST DISCHARGE  -He will continue with Xarelto, for at least 6 months.   -He will follow-up with his PCP.  -counseled to seek immediate medical attention, or return to the ED for recurrent or worsening symptoms.      DISCHARGE DIAGNOSES  Principal  Problem:    Acute pulmonary embolism (HCC) POA: Yes  Active Problems:    Dyslipidemia POA: Yes    HTN (hypertension) POA: Yes  Resolved Problems:    Lactic acidosis POA: Yes    Elevated troponin due to heart strain from PE POA: Yes    Acute hypoxemic respiratory failure due to PE (HCC) POA: Yes    Leukocytosis POA: Yes    Sinus tachycardia POA: Yes      FOLLOW UP  No future appointments.  No follow-up provider specified.    MEDICATIONS ON DISCHARGE     Medication List      START taking these medications      Instructions   * rivaroxaban 15 MG Tabs tablet  Commonly known as:  XARELTO   Take 1 Tab by mouth 2 times a day, with meals for 20 days.  Dose:  15 mg     * rivaroxaban 20 MG Tabs tablet  Start taking on:  10/1/2019  Commonly known as:  XARELTO   Take 1 Tab by mouth with dinner for 30 days.  Dose:  20 mg         * This list has 2 medication(s) that are the same as other medications prescribed for you. Read the directions carefully, and ask your doctor or other care provider to review them with you.            CONTINUE taking these medications      Instructions   lisinopril 20 MG Tabs  Commonly known as:  PRINIVIL   Take 20 mg by mouth every evening.  Dose:  20 mg            Allergies  No Known Allergies    DIET  Orders Placed This Encounter   Procedures   • Diet Order Regular     Standing Status:   Standing     Number of Occurrences:   1     Order Specific Question:   Diet:     Answer:   Regular [1]       ACTIVITY  As tolerated.  Weight bearing as tolerated    CONSULTATIONS  Pulmonary    PROCEDURES  As above    LABORATORY  Lab Results   Component Value Date    SODIUM 140 09/09/2019    POTASSIUM 4.3 09/09/2019    CHLORIDE 107 09/09/2019    CO2 22 09/09/2019    GLUCOSE 125 (H) 09/09/2019    BUN 12 09/09/2019    CREATININE 1.22 09/09/2019    CREATININE 1.20 08/20/2010        Lab Results   Component Value Date    WBC 10.0 09/09/2019    HEMOGLOBIN 15.4 09/09/2019    HEMATOCRIT 46.5 09/09/2019    PLATELETCT 192  09/09/2019        Total time of the discharge process = 38 minutes.

## 2019-09-10 NOTE — PROGRESS NOTES
Monitor summary: 0.16/0.10/0.36   Sinus Rhythm 63 - 85   with rare PACs, rare PVCs, Ventricular escape beats

## 2019-09-10 NOTE — PROGRESS NOTES
Received report from previous nurse, Heaven, regarding prior 12 hours.  POC reviewed with pt, white board updated, pt verbalized understanding, call light within reach.  Pt encouraged to call before getting up.  Bed in lowest position, treaded slippers on.

## 2019-09-10 NOTE — PROGRESS NOTES
Pt discharged to home. Ambulated to private vehicle with this RN. IVsremoved, tips intact. Edu on PE and PE D/C given. Rx: eSent.

## 2019-09-10 NOTE — DISCHARGE INSTRUCTIONS
Discharge Instructions per Sandro Fox M.D.    -continue with Xarelto, for at least 6 months.   -follow-up with your PCP.  -seek immediate medical attention, or return to the ED for recurrent or worsening symptoms.    DIET: regular    ACTIVITY: as tolerated    DIAGNOSIS: Pulmonary Embolism    Discharge Instructions    Discharged to home by car with relative. Discharged via wheelchair, hospital escort: Yes.  Special equipment needed: Not Applicable    Be sure to schedule a follow-up appointment with your primary care doctor or any specialists as instructed.     Discharge Plan:   Diet Plan: Discussed  Activity Level: Discussed  Confirmed Follow up Appointment: Appointment Scheduled  Confirmed Symptoms Management: Discussed  Medication Reconciliation Updated: Yes  Influenza Vaccine Indication: Patient Refuses(Will get from PCP in October, per Pt)    I understand that a diet low in cholesterol, fat, and sodium is recommended for good health. Unless I have been given specific instructions below for another diet, I accept this instruction as my diet prescription.   Other diet: Regular    Special Instructions:   Deep Vein Thrombosis Discharge Instructions    A deep vein thrombosis (DVT) is a blood clot (thrombus) that develops in a deep vein. A DVT is a clot in the deep, larger veins of the leg, arm, or pelvis. These are more dangerous than clots that might form in veins on the surface of the body. Deep vein thrombosis can lead to complications if the clot breaks off and travels in the bloodstream to the lungs.     CAUSES  Blood clots form in a vein for different reasons. Usually several things cause blood clots. They include:   · The flow of blood slows down.   · The inside of the vein is damaged in some way.   · The person has a condition that makes blood clot more easily. These conditions may include:  · Older age (especially over 75 years old).  · Having a history of blood clots.  · Having major or lengthy  surgery. Hip surgery is particularly high-risk.   · Breaking a hip or leg.  · Sitting or lying still for a long time.  · Cancer or cancer treatment.  · Having a long, thin tube (catheter) placed inside a vein during a medical procedure.   · Being overweight (obese).  · Pregnancy and childbirth.  · Medicines with estrogen.  · Smoking.  · Other circulation or heart problems.     SYMPTOMS  When a clot forms, it can either partially or totally block the blood flow in that vein. Symptoms of a DVT can include:  · Swelling of the leg or arm, especially if one side is much worse.  · Warmth and redness of the leg or arm, especially if one side is much worse.   · Pain in an arm or leg. If the clot is in the leg, symptoms may be more noticeable or worse when standing or walking.  If the blood clot travels to the lung, it may cause:  · Shortness of breath.  · Chest pain. The pain may be worsened by deep breaths.   · Coughing up thick mucus (phlegm), possibly flecked with blood.   Anyone with these symptoms should get emergency medical treatment right away. Call your local emergency  Services (911 in [place] if you have these symptoms.     DIAGNOSIS  If a DVT is suspected, your caregiver will take a full medical history. He or she will also perform a physical exam. Tests that also may be required include:   · Studies of the clotting properties of the blood.   · An ultrasound scan.   · X-rays to show the flow of blood when special dye is injected into the veins (venography).   · Studies of your lungs if you have any chest symptoms.     PREVENTION  · Exercise the legs regularly. Take a brisk 30 minute walk every day.   · Maintain a weight that is appropriate for your height.  · Avoid sitting or lying in bed for long periods of time without moving your legs.   · Women, particularly those over the age of 35, should consider the risks and benefits of taking estrogen medicine, including birth control pills.   · Do not smoke, especially  if you take estrogen medicines.   · Long-distance travel can increase your risk. You should exercise your legs by walking or pumping the muscles every hour.   · In hospital prevention: Prevention may include medical and non medical measures.     TREATMENT  · The most common treatment for DVT is blood thinning (anticoagulant) medicine, which reduces the blood's tendency to clot. Anticoagulants can stop new blood clots from forming and old ones from growing. They cannot dissolve existing clots. Your body does this by itself over time. Anticoagulants can be given by mouth, by intravenous (IV) access, or by injection. Your caregiver will determine the best program for you.   · Less commonly, clot-dissolving drugs (thrombolytics) are used to dissolve a DVT. They carry a high risk of bleeding, so they are used mainly in severe cases.   · Very rarely, a blood clot in the leg needs to be removed surgically.   · If you are unable to take anticoagulants, your caregiver may arrange for you to have a filter placed in a main vein in your belly (abdomen). This filter prevents clots from traveling to your lungs.     HOME CARE INSTRUCTIONS  Take all medicines prescribed by your caregiver. Follow the directions carefully.   · You will most likely continue taking anticoagulants after you leave the hospital. Your caregiver will advise you on the length of treatment (usually 3 to 5 months, sometimes for life).   · Taking too much or too little of an anticoagulant is dangerous. While taking this type of medicine, you will need to have regular blood tests to be sure the dose is correct. The dose can change for many reasons. It is critically important that you take this medicine exactly as prescribed, and that you have blood tests exactly as directed.   · Many foods can interfere with anticoagulants. These include foods high in vitamin K, such as spinach, kale, broccoli, cabbage, katelyn and turnip greens, [place] sprouts, peas,  cauliflower, seaweed, parsley, beef and pork liver, green tea, and soybean oil. Your caregiver should discuss limits on these foods with you or you should arrange a visit with a dietician to answer your questions.   · Many medicines can interfere with anticoagulants. You must tell your caregiver about any and all medicines you take. This includes all vitamins and supplements. Be especially cautious with aspirin and anti-inflammatory medicines. Ask your caregiver before taking these.   · Anticoagulants can have side effects, mostly excessive bruising or bleeding. You will need to hold pressure over cuts for longer than usual. Avoid alcoholic drinks or consume only very small amounts while taking this medicine.    If you are taking an anticoagulant:  · Wear a medical alert bracelet.  · Notify your dentist or other caregivers before procedures.  · Avoid contact sports.    · Ask your caregiver how soon you can go back to normal activities. Not being active can lead to new clots. Ask for a list of what you should and should not do.   · Exercise your lower leg muscles. This is important while traveling.   · You may need to wear compression stockings. These are tight elastic stockings that apply pressure to the lower legs. This can help keep the blood in the legs from clotting.   · If you are a smoker, you should quit.   · Learn as much as you can about DVT.     SEEK MEDICAL CARE IF:  · You have unusual bruising or any bleeding problems.  · The swelling or pain in your affected arm or leg is not gradually improving.   · You anticipate surgery or long-distance travel. You should get specific advice on DVT prevention.   · You discover other family members with blood clots. This may require further testing for inherited diseases or conditions.     SEEK IMMEDIATE MEDICAL CARE IF:  · You develop chest pain.  · You develop severe shortness of breath.  · You begin to cough up bloody mucus or phlegm (sputum).  · You feel dizzy or  faint.   · You develop swelling or pain in the leg.  · You have breathing problems after traveling.    MAKE SURE YOU:  · Understand these instructions.  · Will watch your condition.  · Will get help right away if you are not doing well or get worse.       · Is patient discharged on Warfarin / Coumadin?   No     Depression / Suicide Risk    As you are discharged from this [facility] facility, it is important to learn how to keep safe from harming yourself.    Recognize the warning signs:  · Abrupt changes in personality, positive or negative- including increase in energy   · Giving away possessions  · Change in eating patterns- significant weight changes-  positive or negative  · Change in sleeping patterns- unable to sleep or sleeping all the time   · Unwillingness or inability to communicate  · Depression  · Unusual sadness, discouragement and loneliness  · Talk of wanting to die  · Neglect of personal appearance   · Rebelliousness- reckless behavior  · Withdrawal from people/activities they love  · Confusion- inability to concentrate     If you or a loved one observes any of these behaviors or has concerns about self-harm, here's what you can do:  · Talk about it- your feelings and reasons for harming yourself  · Remove any means that you might use to hurt yourself (examples: pills, rope, extension cords, firearm)  · Get professional help from the community (Mental Health, Substance Abuse, psychological counseling)  · Do not be alone:Call your Safe Contact- someone whom you trust who will be there for you.  · Call your local CRISIS HOTLINE 405-5868 or 179-019-6899  · Call your local Children's Mobile Crisis Response Team [place] (189) 563-6496 or www.[employer]  · Call the toll free National Suicide Prevention Hotlines   · National Suicide Prevention Lifeline 662-039-NAKF (0497)  · National Hope Line Network 800-SUICIDE (924-4259)        Pulmonary Embolism  A pulmonary embolism (PE) is a sudden  blockage or decrease of blood flow in one lung or both lungs. Most blockages come from a blood clot that travels from the legs or the pelvis to the lungs. PE is a dangerous and potentially life-threatening condition if it is not treated right away.  What are the causes?  A pulmonary embolism occurs most commonly when a blood clot travels from one of your veins to your lungs. Rarely, PE is caused by air, fat, amniotic fluid, or part of a tumor traveling through your veins to your lungs.  What increases the risk?  A PE is more likely to develop in:  · People who smoke.  · People who are older, especially over 60 years of age.  · People who are overweight (obese).  · People who sit or lie still for a long time, such as during long-distance travel (over 4 hours), bed rest, hospitalization, or during recovery from certain medical conditions like a stroke.  · People who do not engage in much physical activity (sedentary lifestyle).  · People who have chronic breathing disorders.  · People who have a personal or family history of blood clots or blood clotting disease.  · People who have peripheral vascular disease (PVD), diabetes, or some types of cancer.  · People who have heart disease, especially if the person had a recent heart attack or has congestive heart failure.  · People who have neurological diseases that affect the legs (leg paresis).  · People who have had a traumatic injury, such as breaking a hip or leg.  · People who have recently had major or lengthy surgery, especially on the hip, knee, or abdomen.  · People who have had a central line placed inside a large vein.  · People who take medicines that contain the hormone estrogen. These include birth control pills and hormone replacement therapy.  · Pregnancy or during childbirth or the postpartum period.  What are the signs or symptoms?  The symptoms of a PE usually start suddenly and include:  · Shortness of breath while active or at rest.  · Coughing or  coughing up blood or blood-tinged mucus.  · Chest pain that is often worse with deep breaths.  · Rapid or irregular heartbeat.  · Feeling light-headed or dizzy.  · Fainting.  · Feeling anxious.  · Sweating.  There may also be pain and swelling in a leg if that is where the blood clot started.  These symptoms may represent a serious problem that is an emergency. Do not wait to see if the symptoms will go away. Get medical help right away. Call your local emergency services (911 in the [place] Do not drive yourself to the hospital.   How is this diagnosed?  Your health care provider will take a medical history and perform a physical exam. You may also have other tests, including:  · Blood tests to assess the clotting properties of your blood, assess oxygen levels in your blood, and find blood clots.  · Imaging tests, such as CT, ultrasound, MRI, X-ray, and other tests to see if you have clots anywhere in your body.  · An electrocardiogram (ECG) to look for heart strain from blood clots in the lungs.  How is this treated?  The main goals of PE treatment are:  · To stop a blood clot from growing larger.  · To stop new blood clots from forming.  The type of treatment that you receive depends on many factors, such as the cause of your PE, your risk for bleeding or developing more clots, and other medical conditions that you have. Sometimes, a combination of treatments is necessary.  This condition may be treated with:  · Medicines, including newer oral blood thinners (anticoagulants), warfarin, low molecular weight heparins, thrombolytics, or heparins.  · Wearing compression stockings or using different types of devices.  · Surgery (rare) to remove the blood clot or to place a filter in your abdomen to stop the blood clot from traveling to your lungs.  Treatments for a PE are often divided into immediate treatment, long-term treatment (up to 3 months after PE), and extended treatment (more than 3 months after PE). Your  treatment may continue for several months. This is called maintenance therapy, and it is used to prevent the forming of new blood clots. You can work with your health care provider to choose the treatment program that is best for you.  What are anticoagulants?   Anticoagulants are medicines that treat PEs. They can stop current blood clots from growing and stop new clots from forming. They cannot dissolve existing clots. Your body dissolves clots by itself over time. Anticoagulants are given by mouth, by injection, or through an IV tube.  What are thrombolytics?   Thrombolytics are clot-dissolving medicines that are used to dissolve a PE. They carry a high risk of bleeding, so they tend to be used only in severe cases or if you have very low blood pressure.  Follow these instructions at home:  If you are taking a newer oral anticoagulant:  · Take the medicine every single day at the same time each day.  · Understand what foods and drugs interact with this medicine.  · Understand that there are no regular blood tests required when using this medicine.  · Understand the side effects of this medicine, including excessive bruising or bleeding. Ask your health care provider or pharmacist about other possible side effects.  If you are taking warfarin:  · Understand how to take warfarin and know which foods can affect how warfarin works in your body.  · Understand that it is dangerous to take too much or too little warfarin. Too much warfarin increases the risk of bleeding. Too little warfarin continues to allow the risk for blood clots.  · Follow your PT and INR blood testing schedule. The PT and INR results allow your health care provider to adjust your dose of warfarin. It is very important that you have your PT and INR tested as often as told by your health care provider.  · Avoid major changes in your diet, or tell your health care provider before you change your diet. Arrange a visit with a registered dietitian to  answer your questions. Many foods, especially foods that are high in vitamin K, can interfere with warfarin and affect the PT and INR results. Eat a consistent amount of foods that are high in vitamin K, such as:  ¨ Spinach, kale, broccoli, cabbage, katelyn greens, turnip greens, [place] sprouts, peas, cauliflower, seaweed, and parsley.  ¨ Beef liver and pork liver.  ¨ Green tea.  ¨ Soybean oil.  · Tell your health care provider about any and all medicines, vitamins, and supplements that you take, including aspirin and other over-the-counter anti-inflammatory medicines. Be especially cautious with aspirin and anti-inflammatory medicines. Do not take those before you ask your health care provider if it is safe to do so. This is important because many medicines can interfere with warfarin and affect the PT and INR results.  · Do not start or stop taking any over-the-counter or prescription medicine unless your health care provider or pharmacist tells you to do so.  If you take warfarin, you will also need to do these things:  · Hold pressure over cuts for longer than usual.  · Tell your dentist and other health care providers that you are taking warfarin before you have any procedures in which bleeding may occur.  · Avoid alcohol or drink very small amounts. Tell your health care provider if you change your alcohol intake.  · Do not use tobacco products, including cigarettes, chewing tobacco, and e-cigarettes. If you need help quitting, ask your health care provider.  · Avoid contact sports.  General instructions  · Take over-the-counter and prescription medicines only as told by your health care provider. Anticoagulant medicines can have side effects, including easy bruising and difficulty stopping bleeding. If you are prescribed an anticoagulant, you will also need to do these things:  ¨ Hold pressure over cuts for longer than usual.  ¨ Tell your dentist and other health care providers that you are taking  anticoagulants before you have any procedures in which bleeding may occur.  ¨ Avoid contact sports.  · Wear a medical alert bracelet or carry a medical alert card that says you have had a PE.  · Ask your health care provider how soon you can go back to your normal activities. Stay active to prevent new blood clots from forming.  · Make sure to exercise while traveling or when you have been sitting or standing for a long period of time. It is very important to exercise. Exercise your legs by walking or by tightening and relaxing your leg muscles often. Take frequent walks.  · Wear compression stockings as told by your health care provider to help prevent more blood clots from forming.  · Do not use tobacco products, including cigarettes, chewing tobacco, and e-cigarettes. If you need help quitting, ask your health care provider.  · Keep all follow-up appointments with your health care provider. This is important.  How is this prevented?  Take these actions to decrease your risk of developing another PE:  · Exercise regularly. For at least 30 minutes every day, engage in:  ¨ Activity that involves moving your arms and legs.  ¨ Activity that encourages good blood flow through your body by increasing your heart rate.  · Exercise your arms and legs every hour during long-distance travel (over 4 hours). Drink plenty of water and avoid drinking alcohol while traveling.  · Avoid sitting or lying in bed for long periods of time without moving your legs.  · Maintain a weight that is appropriate for your height. Ask your health care provider what weight is healthy for you.  · If you are a woman who is over 35 years of age, avoid unnecessary use of medicines that contain estrogen. These include birth control pills.  · Do not smoke, especially if you take estrogen medicines. If you need help quitting, ask your health care provider.  · If you are at very high risk for PE, wear compression stockings.  · If you recently had a PE,  have regularly scheduled ultrasound testing on your legs to check for new blood clots.  If you are hospitalized, prevention measures may include:  · Early walking after surgery, as soon as your health care provider says that it is safe.  · Receiving anticoagulants to prevent blood clots. If you cannot take anticoagulants, other options may be available, such as wearing compression stockings or using different types of devices.  Get help right away if:  · You have new or increased pain, swelling, or redness in an arm or leg.  · You have numbness or tingling in an arm or leg.  · You have shortness of breath while active or at rest.  · You have chest pain.  · You have a rapid or irregular heartbeat.  · You feel light-headed or dizzy.  · You cough up blood.  · You notice blood in your vomit, bowel movement, or urine.  · You have a fever.  These symptoms may represent a serious problem that is an emergency. Do not wait to see if the symptoms will go away. Get medical help right away. Call your local emergency services (911 in the [place] Do not drive yourself to the hospital.   This information is not intended to replace advice given to you by your health care provider. Make sure you discuss any questions you have with your health care provider.  Document Released: 12/15/2001 Document Revised: 05/25/2017 Document Reviewed: 04/13/2016  [employer] Interactive Patient Education © 2017 [employer]

## 2019-09-11 LAB
AT III ACT/NOR PPP CHRO: 98 % (ref 76–128)
AT III AG ACT/NOR PPP IA: 80 % (ref 82–136)
BACTERIA UR CULT: NORMAL
F5 P.R506Q BLD/T QL: NEGATIVE
PROT C ACT/NOR PPP: 140 % (ref 83–168)
PROT S ACT/NOR PPP: 133 % (ref 66–143)
SIGNIFICANT IND 70042: NORMAL
SITE SITE: NORMAL
SOURCE SOURCE: NORMAL

## 2019-09-13 LAB
BACTERIA BLD CULT: NORMAL
BACTERIA BLD CULT: NORMAL
SIGNIFICANT IND 70042: NORMAL
SIGNIFICANT IND 70042: NORMAL
SITE SITE: NORMAL
SITE SITE: NORMAL
SOURCE SOURCE: NORMAL
SOURCE SOURCE: NORMAL

## 2020-11-05 ENCOUNTER — NON-PROVIDER VISIT (OUTPATIENT)
Dept: URGENT CARE | Facility: PHYSICIAN GROUP | Age: 45
End: 2020-11-05

## 2020-11-05 DIAGNOSIS — Z02.83 ENCOUNTER FOR DRUG SCREENING: ICD-10-CM

## 2020-11-05 PROCEDURE — 8907 PR URINE COLLECT ONLY: Performed by: FAMILY MEDICINE

## 2021-02-08 ENCOUNTER — HOSPITAL ENCOUNTER (OUTPATIENT)
Dept: LAB | Facility: MEDICAL CENTER | Age: 46
End: 2021-02-08
Attending: OTOLARYNGOLOGY
Payer: COMMERCIAL

## 2021-02-08 LAB
COVID ORDER STATUS COVID19: NORMAL
SARS-COV-2 RNA RESP QL NAA+PROBE: NOTDETECTED
SPECIMEN SOURCE: NORMAL

## 2021-02-08 PROCEDURE — C9803 HOPD COVID-19 SPEC COLLECT: HCPCS

## 2021-02-08 PROCEDURE — U0003 INFECTIOUS AGENT DETECTION BY NUCLEIC ACID (DNA OR RNA); SEVERE ACUTE RESPIRATORY SYNDROME CORONAVIRUS 2 (SARS-COV-2) (CORONAVIRUS DISEASE [COVID-19]), AMPLIFIED PROBE TECHNIQUE, MAKING USE OF HIGH THROUGHPUT TECHNOLOGIES AS DESCRIBED BY CMS-2020-01-R: HCPCS

## 2021-02-08 PROCEDURE — U0005 INFEC AGEN DETEC AMPLI PROBE: HCPCS

## 2021-03-19 ENCOUNTER — HOSPITAL ENCOUNTER (OUTPATIENT)
Dept: LAB | Facility: MEDICAL CENTER | Age: 46
End: 2021-03-19
Attending: ANESTHESIOLOGY
Payer: COMMERCIAL

## 2021-03-19 LAB
ANION GAP SERPL CALC-SCNC: 12 MMOL/L (ref 7–16)
BUN SERPL-MCNC: 15 MG/DL (ref 8–22)
CALCIUM SERPL-MCNC: 9.3 MG/DL (ref 8.5–10.5)
CHLORIDE SERPL-SCNC: 105 MMOL/L (ref 96–112)
CO2 SERPL-SCNC: 23 MMOL/L (ref 20–33)
CREAT SERPL-MCNC: 1.74 MG/DL (ref 0.5–1.4)
GLUCOSE SERPL-MCNC: 81 MG/DL (ref 65–99)
POTASSIUM SERPL-SCNC: 3.9 MMOL/L (ref 3.6–5.5)
SODIUM SERPL-SCNC: 140 MMOL/L (ref 135–145)

## 2021-03-19 PROCEDURE — 36415 COLL VENOUS BLD VENIPUNCTURE: CPT

## 2021-03-19 PROCEDURE — 80048 BASIC METABOLIC PNL TOTAL CA: CPT

## 2021-03-29 ENCOUNTER — HOSPITAL ENCOUNTER (OUTPATIENT)
Dept: LAB | Facility: MEDICAL CENTER | Age: 46
End: 2021-03-29
Attending: ANESTHESIOLOGY
Payer: COMMERCIAL

## 2021-03-29 PROCEDURE — U0005 INFEC AGEN DETEC AMPLI PROBE: HCPCS

## 2021-03-29 PROCEDURE — C9803 HOPD COVID-19 SPEC COLLECT: HCPCS

## 2021-03-29 PROCEDURE — U0003 INFECTIOUS AGENT DETECTION BY NUCLEIC ACID (DNA OR RNA); SEVERE ACUTE RESPIRATORY SYNDROME CORONAVIRUS 2 (SARS-COV-2) (CORONAVIRUS DISEASE [COVID-19]), AMPLIFIED PROBE TECHNIQUE, MAKING USE OF HIGH THROUGHPUT TECHNOLOGIES AS DESCRIBED BY CMS-2020-01-R: HCPCS

## 2022-09-13 ENCOUNTER — NON-PROVIDER VISIT (OUTPATIENT)
Dept: URGENT CARE | Facility: PHYSICIAN GROUP | Age: 47
End: 2022-09-13

## 2022-09-13 DIAGNOSIS — Z02.1 PRE-EMPLOYMENT DRUG SCREENING: ICD-10-CM

## 2022-09-13 LAB
AMP AMPHETAMINE: NEGATIVE
COC COCAINE: NEGATIVE
INT CON NEG: NORMAL
INT CON POS: NORMAL
MET METHAMPHETAMINES: NEGATIVE
OPI OPIATES: NEGATIVE
PCP PHENCYCLIDINE: NEGATIVE
POC DRUG COMMENT 753798-OCCUPATIONAL HEALTH: NORMAL
THC: NEGATIVE

## 2022-09-13 PROCEDURE — 80305 DRUG TEST PRSMV DIR OPT OBS: CPT | Performed by: FAMILY MEDICINE

## 2023-02-24 ENCOUNTER — HOSPITAL ENCOUNTER (OUTPATIENT)
Dept: LAB | Facility: MEDICAL CENTER | Age: 48
End: 2023-02-24
Attending: NURSE PRACTITIONER
Payer: COMMERCIAL

## 2023-02-24 LAB
ALBUMIN SERPL BCP-MCNC: 4.5 G/DL (ref 3.2–4.9)
ALBUMIN/GLOB SERPL: 1.4 G/DL
ALP SERPL-CCNC: 59 U/L (ref 30–99)
ALT SERPL-CCNC: 66 U/L (ref 2–50)
ANION GAP SERPL CALC-SCNC: 13 MMOL/L (ref 7–16)
AST SERPL-CCNC: 41 U/L (ref 12–45)
BASOPHILS # BLD AUTO: 0.9 % (ref 0–1.8)
BASOPHILS # BLD: 0.07 K/UL (ref 0–0.12)
BILIRUB SERPL-MCNC: 0.9 MG/DL (ref 0.1–1.5)
BUN SERPL-MCNC: 14 MG/DL (ref 8–22)
CALCIUM ALBUM COR SERPL-MCNC: 9.3 MG/DL (ref 8.5–10.5)
CALCIUM SERPL-MCNC: 9.7 MG/DL (ref 8.5–10.5)
CHLORIDE SERPL-SCNC: 104 MMOL/L (ref 96–112)
CHOLEST SERPL-MCNC: 280 MG/DL (ref 100–199)
CO2 SERPL-SCNC: 25 MMOL/L (ref 20–33)
CREAT SERPL-MCNC: 1.14 MG/DL (ref 0.5–1.4)
EOSINOPHIL # BLD AUTO: 0.14 K/UL (ref 0–0.51)
EOSINOPHIL NFR BLD: 1.8 % (ref 0–6.9)
ERYTHROCYTE [DISTWIDTH] IN BLOOD BY AUTOMATED COUNT: 41.2 FL (ref 35.9–50)
GFR SERPLBLD CREATININE-BSD FMLA CKD-EPI: 79 ML/MIN/1.73 M 2
GLOBULIN SER CALC-MCNC: 3.2 G/DL (ref 1.9–3.5)
GLUCOSE SERPL-MCNC: 80 MG/DL (ref 65–99)
HCT VFR BLD AUTO: 53.3 % (ref 42–52)
HDLC SERPL-MCNC: 66 MG/DL
HGB BLD-MCNC: 18.4 G/DL (ref 14–18)
IMM GRANULOCYTES # BLD AUTO: 0.03 K/UL (ref 0–0.11)
IMM GRANULOCYTES NFR BLD AUTO: 0.4 % (ref 0–0.9)
LDLC SERPL CALC-MCNC: 187 MG/DL
LYMPHOCYTES # BLD AUTO: 1.57 K/UL (ref 1–4.8)
LYMPHOCYTES NFR BLD: 20.1 % (ref 22–41)
MCH RBC QN AUTO: 31.7 PG (ref 27–33)
MCHC RBC AUTO-ENTMCNC: 34.5 G/DL (ref 33.7–35.3)
MCV RBC AUTO: 91.7 FL (ref 81.4–97.8)
MONOCYTES # BLD AUTO: 0.51 K/UL (ref 0–0.85)
MONOCYTES NFR BLD AUTO: 6.5 % (ref 0–13.4)
NEUTROPHILS # BLD AUTO: 5.5 K/UL (ref 1.82–7.42)
NEUTROPHILS NFR BLD: 70.3 % (ref 44–72)
NRBC # BLD AUTO: 0 K/UL
NRBC BLD-RTO: 0 /100 WBC
PLATELET # BLD AUTO: 284 K/UL (ref 164–446)
PMV BLD AUTO: 10.7 FL (ref 9–12.9)
POTASSIUM SERPL-SCNC: 4.3 MMOL/L (ref 3.6–5.5)
PROT SERPL-MCNC: 7.7 G/DL (ref 6–8.2)
PSA SERPL-MCNC: 0.95 NG/ML (ref 0–4)
RBC # BLD AUTO: 5.81 M/UL (ref 4.7–6.1)
SODIUM SERPL-SCNC: 142 MMOL/L (ref 135–145)
TRIGL SERPL-MCNC: 136 MG/DL (ref 0–149)
TSH SERPL DL<=0.005 MIU/L-ACNC: 1.77 UIU/ML (ref 0.38–5.33)
WBC # BLD AUTO: 7.8 K/UL (ref 4.8–10.8)

## 2023-02-24 PROCEDURE — 36415 COLL VENOUS BLD VENIPUNCTURE: CPT

## 2023-02-24 PROCEDURE — 84443 ASSAY THYROID STIM HORMONE: CPT

## 2023-02-24 PROCEDURE — 80053 COMPREHEN METABOLIC PANEL: CPT

## 2023-02-24 PROCEDURE — 84153 ASSAY OF PSA TOTAL: CPT

## 2023-02-24 PROCEDURE — 85025 COMPLETE CBC W/AUTO DIFF WBC: CPT

## 2023-02-24 PROCEDURE — 80061 LIPID PANEL: CPT

## 2024-04-02 ENCOUNTER — SUPERVISING PHYSICIAN REVIEW (OUTPATIENT)
Dept: URGENT CARE | Facility: CLINIC | Age: 49
End: 2024-04-02

## 2024-04-02 ENCOUNTER — OFFICE VISIT (OUTPATIENT)
Dept: URGENT CARE | Facility: PHYSICIAN GROUP | Age: 49
End: 2024-04-02
Payer: COMMERCIAL

## 2024-04-02 VITALS
SYSTOLIC BLOOD PRESSURE: 164 MMHG | HEIGHT: 75 IN | DIASTOLIC BLOOD PRESSURE: 102 MMHG | HEART RATE: 90 BPM | OXYGEN SATURATION: 93 % | BODY MASS INDEX: 35.43 KG/M2 | TEMPERATURE: 97.4 F | RESPIRATION RATE: 18 BRPM | WEIGHT: 285 LBS

## 2024-04-02 DIAGNOSIS — J06.9 VIRAL UPPER RESPIRATORY ILLNESS: ICD-10-CM

## 2024-04-02 PROCEDURE — 3080F DIAST BP >= 90 MM HG: CPT | Performed by: PHYSICIAN ASSISTANT

## 2024-04-02 PROCEDURE — 99203 OFFICE O/P NEW LOW 30 MIN: CPT | Performed by: PHYSICIAN ASSISTANT

## 2024-04-02 PROCEDURE — 3077F SYST BP >= 140 MM HG: CPT | Performed by: PHYSICIAN ASSISTANT

## 2024-04-02 RX ORDER — METHYLPREDNISOLONE 4 MG/1
4 TABLET ORAL DAILY
Qty: 21 TABLET | Refills: 0 | Status: SHIPPED
Start: 2024-04-02 | End: 2024-04-16

## 2024-04-02 RX ORDER — BENZONATATE 100 MG/1
100 CAPSULE ORAL 3 TIMES DAILY PRN
Qty: 60 CAPSULE | Refills: 0 | Status: SHIPPED
Start: 2024-04-02 | End: 2024-04-16

## 2024-04-02 ASSESSMENT — ENCOUNTER SYMPTOMS
SORE THROAT: 0
DIARRHEA: 0
CONSTIPATION: 0
ABDOMINAL PAIN: 0
SHORTNESS OF BREATH: 0
CHILLS: 0
EYE REDNESS: 0
NAUSEA: 0
HEADACHES: 1
COUGH: 1
SINUS PAIN: 0
WHEEZING: 0
DIZZINESS: 0
VOMITING: 0
EYE DISCHARGE: 0
FEVER: 0
DIAPHORESIS: 0
EYE PAIN: 0

## 2024-04-02 ASSESSMENT — FIBROSIS 4 INDEX: FIB4 SCORE: 0.87

## 2024-04-02 NOTE — PROGRESS NOTES
"  Subjective:     Deejay Pham  is a 48 y.o. male who presents for Other (States that he had the flu last week and states he's symptoms resolved, but is concern of having a sinus infection )       He presents today with a cough that has been ongoing over the last 4-5 days, he episodes he does experience a headache.  He does note having flulike symptoms 2 weeks ago, though symptoms did fully resolve before onset of his current symptoms.  At this time he denies fever/chills/sweats, chest pain, shortness of breath, nausea/vomiting, abdominal pain, diarrhea.  No history of asthma or other pulmonary pathology.  Has been using Mucinex for symptoms       Review of Systems   Constitutional:  Negative for chills, diaphoresis, fever and malaise/fatigue.   HENT:  Negative for congestion, ear discharge, sinus pain and sore throat.    Eyes:  Negative for pain, discharge and redness.   Respiratory:  Positive for cough. Negative for shortness of breath and wheezing.    Cardiovascular:  Negative for chest pain.   Gastrointestinal:  Negative for abdominal pain, constipation, diarrhea, nausea and vomiting.   Neurological:  Positive for headaches. Negative for dizziness.      No Known Allergies  Past Medical History:   Diagnosis Date    Hypertension     Obesity, unspecified         Objective:   BP (!) 164/102 (BP Location: Left arm, Patient Position: Sitting, BP Cuff Size: Adult long)   Pulse 90   Temp 36.3 °C (97.4 °F) (Temporal)   Resp 18   Ht 1.905 m (6' 3\")   Wt (!) 129 kg (285 lb)   SpO2 93%   BMI 35.62 kg/m²   Physical Exam  Vitals and nursing note reviewed.   Constitutional:       General: He is not in acute distress.     Appearance: Normal appearance. He is not ill-appearing, toxic-appearing or diaphoretic.   HENT:      Head: Normocephalic.      Right Ear: Tympanic membrane, ear canal and external ear normal. There is no impacted cerumen.      Left Ear: Tympanic membrane, ear canal and external ear normal. There is no " impacted cerumen.      Nose: No congestion or rhinorrhea.      Mouth/Throat:      Mouth: Mucous membranes are moist.      Pharynx: No oropharyngeal exudate or posterior oropharyngeal erythema.   Eyes:      General:         Right eye: No discharge.         Left eye: No discharge.      Conjunctiva/sclera: Conjunctivae normal.   Cardiovascular:      Rate and Rhythm: Normal rate and regular rhythm.   Pulmonary:      Effort: Pulmonary effort is normal. No respiratory distress.      Breath sounds: Normal breath sounds. No stridor. No wheezing or rhonchi.   Musculoskeletal:      Cervical back: Neck supple.   Lymphadenopathy:      Cervical: No cervical adenopathy.   Neurological:      General: No focal deficit present.      Mental Status: He is alert and oriented to person, place, and time.   Psychiatric:         Mood and Affect: Mood normal.         Behavior: Behavior normal.         Thought Content: Thought content normal.         Judgment: Judgment normal.             Diagnostic testing: None    Assessment/Plan:     Encounter Diagnoses   Name Primary?    Viral upper respiratory illness           Plan for care for today's complaint includes starting the patient on Medrol Dosepak and Tessalon Perles for viral URI and acute cough.  Discussed with the patient symptoms do not correlate with bacterial infection at this time.  Lung auscultation normal.  Vital signs are stable patient is well-appearing overall.  Continue over-the-counter Mucinex.  Continue to monitor symptoms and return to urgent care or follow-up with primary care provider if symptoms remain ongoing.  Follow-up in the emergency department if symptoms become severe, ER precautions discussed in office today..  Prescription for Medrol Dosepak, Tessalon Perles provided.    See AVS Instructions below for written guidance provided to patient on after-visit management and care in addition to our verbal discussion during the visit.    Please note that this dictation  was created using voice recognition software. I have attempted to correct all errors, but there may be sound-alike, spelling, grammar and possibly content errors that I did not discover before finalizing the note.    Jake Ray PA-C

## 2024-04-16 ENCOUNTER — OFFICE VISIT (OUTPATIENT)
Dept: URGENT CARE | Facility: CLINIC | Age: 49
End: 2024-04-16
Payer: COMMERCIAL

## 2024-04-16 VITALS
HEART RATE: 101 BPM | HEIGHT: 75 IN | SYSTOLIC BLOOD PRESSURE: 170 MMHG | TEMPERATURE: 98.4 F | OXYGEN SATURATION: 99 % | WEIGHT: 298.5 LBS | RESPIRATION RATE: 18 BRPM | BODY MASS INDEX: 37.12 KG/M2 | DIASTOLIC BLOOD PRESSURE: 110 MMHG

## 2024-04-16 DIAGNOSIS — I10 ELEVATED BLOOD PRESSURE READING IN OFFICE WITH DIAGNOSIS OF HYPERTENSION: ICD-10-CM

## 2024-04-16 DIAGNOSIS — I10 WHITE COAT SYNDROME WITH DIAGNOSIS OF HYPERTENSION: ICD-10-CM

## 2024-04-16 DIAGNOSIS — R42 DIZZY: ICD-10-CM

## 2024-04-16 PROBLEM — Z86.718 HISTORY OF THROMBOEMBOLISM: Status: ACTIVE | Noted: 2020-01-02

## 2024-04-16 PROBLEM — R74.8 ELEVATED LIVER ENZYMES: Status: ACTIVE | Noted: 2023-02-28

## 2024-04-16 PROBLEM — Z86.711 HISTORY OF PULMONARY EMBOLISM: Status: ACTIVE | Noted: 2020-01-02

## 2024-04-16 PROBLEM — J32.0 CHRONIC MAXILLARY SINUSITIS: Status: ACTIVE | Noted: 2020-01-02

## 2024-04-16 PROBLEM — E78.00 PURE HYPERCHOLESTEROLEMIA: Status: ACTIVE | Noted: 2023-02-28

## 2024-04-16 LAB — GLUCOSE BLD-MCNC: 153 MG/DL (ref 65–99)

## 2024-04-16 PROCEDURE — 99213 OFFICE O/P EST LOW 20 MIN: CPT | Performed by: NURSE PRACTITIONER

## 2024-04-16 PROCEDURE — 3077F SYST BP >= 140 MM HG: CPT | Performed by: NURSE PRACTITIONER

## 2024-04-16 PROCEDURE — 82962 GLUCOSE BLOOD TEST: CPT | Performed by: NURSE PRACTITIONER

## 2024-04-16 PROCEDURE — 3080F DIAST BP >= 90 MM HG: CPT | Performed by: NURSE PRACTITIONER

## 2024-04-16 RX ORDER — BUDESONIDE 1 MG/2ML
1 INHALANT ORAL
COMMUNITY

## 2024-04-16 RX ORDER — ATORVASTATIN CALCIUM 10 MG/1
TABLET, FILM COATED ORAL
COMMUNITY

## 2024-04-16 ASSESSMENT — ENCOUNTER SYMPTOMS
SPEECH CHANGE: 0
FOCAL WEAKNESS: 0
ORTHOPNEA: 0
CHILLS: 0
HEMOPTYSIS: 0
SPUTUM PRODUCTION: 0
TINGLING: 0
PALPITATIONS: 0
HEADACHES: 0
SHORTNESS OF BREATH: 0
WHEEZING: 0
LOSS OF CONSCIOUSNESS: 0
COUGH: 0
FEVER: 0
SENSORY CHANGE: 0

## 2024-04-16 ASSESSMENT — FIBROSIS 4 INDEX: FIB4 SCORE: 0.87

## 2024-04-16 NOTE — PROGRESS NOTES
Subjective     Deejay Pham is a 48 y.o. male who presents with Dizziness (Felt flushed)            Deejay presents to urgent care after experiencing an acute episode of dizziness at work today.  He reports that last night he overindulged in beer, consuming 128 ounces over the course of the evening.  He woke up this morning feeling fatigued and a bit hung-over.  He drank a vitamin water, had a few bites of chocolate pastry, and took 2 dips of chewing tobacco.  He was feeling slightly nauseated.  He went to work and felt increasingly nauseated and dizzy.  He did not vomit or sustain any syncopal episode.  He advised his employer of his symptoms and presented here for evaluation.  Since arrival he reports feeling much better with resolved symptoms.  He denies any chest pain, shortness of breath, numbness, tingling, weakness, dizziness, nausea or vomiting at this time.  He is hypertensive in clinic, which he reports is typical for him.  He has white coat hypertension.  He routinely checks his blood pressure at home and it is in the 130s /70s.  He has a history of PE 4 years ago that was provoked after a lower leg injury.  He was not required to continue DOAC or other antithrombotic meds after the initial care for the PE.  He had a recent viral URI that has since resolved.    Current Outpatient Medications on File Prior to Visit:  lisinopril (PRINIVIL) 20 MG Tab, Take 20 mg by mouth every evening., Disp: , Rfl:   Budesonide, Inhalation, 1 MG/2ML Suspension, Inhale 1 Puff every day., Disp: , Rfl:   atorvastatin (LIPITOR) 10 MG Tab, Take 1 tablet every day by oral route at bedtime., Disp: , Rfl:     No current facility-administered medications on file prior to visit.            Review of Systems   Constitutional:  Negative for chills, fever and malaise/fatigue.   Respiratory:  Negative for cough, hemoptysis, sputum production, shortness of breath and wheezing.    Cardiovascular:  Negative for chest pain, palpitations,  "orthopnea and leg swelling.   Neurological:  Negative for tingling, sensory change, speech change, focal weakness, loss of consciousness and headaches.     Medications, Allergies, and current problem list reviewed today in Epic         Objective     Blood Pressure (Abnormal) 170/110   Pulse (Abnormal) 101   Temperature 36.9 °C (98.4 °F) (Temporal)   Respiration 18   Height 1.905 m (6' 3\")   Weight (Abnormal) 135 kg (298 lb 8.1 oz)   Oxygen Saturation 99%   Body Mass Index 37.31 kg/m²      Physical Exam  Vitals reviewed.   Constitutional:       General: He is not in acute distress.     Appearance: Normal appearance. He is well-developed. He is not ill-appearing, toxic-appearing or diaphoretic.   HENT:      Mouth/Throat:      Mouth: Mucous membranes are moist.   Eyes:      General: No scleral icterus.        Right eye: No discharge.         Left eye: No discharge.      Extraocular Movements: Extraocular movements intact.      Conjunctiva/sclera: Conjunctivae normal.      Pupils: Pupils are equal, round, and reactive to light.   Neck:      Thyroid: No thyromegaly.      Vascular: No JVD.      Trachea: No tracheal deviation.   Cardiovascular:      Rate and Rhythm: Regular rhythm. Tachycardia present.      Heart sounds: Normal heart sounds. No murmur heard.     No friction rub. No gallop.      Comments: Repeat blood pressure reading 170/101.  Pulmonary:      Effort: Pulmonary effort is normal. No respiratory distress.      Breath sounds: Normal breath sounds. No stridor. No wheezing or rales.   Chest:      Chest wall: No tenderness.   Abdominal:      Palpations: Abdomen is soft.      Tenderness: There is no right CVA tenderness or left CVA tenderness.   Musculoskeletal:      Cervical back: Neck supple.   Lymphadenopathy:      Cervical: No cervical adenopathy.   Skin:     General: Skin is warm and dry.      Capillary Refill: Capillary refill takes less than 2 seconds.      Coloration: Skin is not jaundiced or pale. "      Findings: No erythema.   Neurological:      General: No focal deficit present.      Mental Status: He is alert and oriented to person, place, and time.      Cranial Nerves: No cranial nerve deficit.      Motor: No weakness.      Coordination: Coordination normal.      Gait: Gait normal.   Psychiatric:         Mood and Affect: Mood normal.         Behavior: Behavior normal.             POCT glucose 153.                  Assessment & Plan        1. Dizzy    - POCT Glucose    2. Elevated blood pressure reading in office with diagnosis of hypertension      3. White coat syndrome with diagnosis of hypertension    Discussed exam findings with Deejay.  As his symptoms have completely resolved, suspect that he had some mild pre-syncopal sensation secondary to hangover and recent alcohol overindulgence.  Differential reviewed.  He politely declined in clinic EKG.  Maintain adequate hydration.  Avoid excess alcohol consumption.  Re-check blood pressure within 1 week.  Consider checking at-home machine to be sure it is calibrated and the readings are correct.  Follow up with PCP for RTC for any returned symptoms.  ED precautions reviewed.  He verbalized understanding of and agreed with plan of care.

## 2024-08-05 ENCOUNTER — HOSPITAL ENCOUNTER (OUTPATIENT)
Dept: LAB | Facility: MEDICAL CENTER | Age: 49
End: 2024-08-05
Attending: NURSE PRACTITIONER
Payer: COMMERCIAL

## 2024-08-05 LAB
ALBUMIN SERPL BCP-MCNC: 4.2 G/DL (ref 3.2–4.9)
ALBUMIN/GLOB SERPL: 1.4 G/DL
ALP SERPL-CCNC: 57 U/L (ref 30–99)
ALT SERPL-CCNC: 75 U/L (ref 2–50)
ANION GAP SERPL CALC-SCNC: 14 MMOL/L (ref 7–16)
AST SERPL-CCNC: 40 U/L (ref 12–45)
BASOPHILS # BLD AUTO: 0.7 % (ref 0–1.8)
BASOPHILS # BLD: 0.04 K/UL (ref 0–0.12)
BILIRUB SERPL-MCNC: 0.9 MG/DL (ref 0.1–1.5)
BUN SERPL-MCNC: 14 MG/DL (ref 8–22)
CALCIUM ALBUM COR SERPL-MCNC: 9.2 MG/DL (ref 8.5–10.5)
CALCIUM SERPL-MCNC: 9.4 MG/DL (ref 8.5–10.5)
CHLORIDE SERPL-SCNC: 103 MMOL/L (ref 96–112)
CHOLEST SERPL-MCNC: 263 MG/DL (ref 100–199)
CO2 SERPL-SCNC: 23 MMOL/L (ref 20–33)
CREAT SERPL-MCNC: 1.21 MG/DL (ref 0.5–1.4)
EOSINOPHIL # BLD AUTO: 0.21 K/UL (ref 0–0.51)
EOSINOPHIL NFR BLD: 3.7 % (ref 0–6.9)
ERYTHROCYTE [DISTWIDTH] IN BLOOD BY AUTOMATED COUNT: 42.5 FL (ref 35.9–50)
GFR SERPLBLD CREATININE-BSD FMLA CKD-EPI: 73 ML/MIN/1.73 M 2
GLOBULIN SER CALC-MCNC: 2.9 G/DL (ref 1.9–3.5)
GLUCOSE SERPL-MCNC: 124 MG/DL (ref 65–99)
HCT VFR BLD AUTO: 54.1 % (ref 42–52)
HDLC SERPL-MCNC: 61 MG/DL
HGB BLD-MCNC: 18.8 G/DL (ref 14–18)
IMM GRANULOCYTES # BLD AUTO: 0.02 K/UL (ref 0–0.11)
IMM GRANULOCYTES NFR BLD AUTO: 0.3 % (ref 0–0.9)
LDLC SERPL CALC-MCNC: 175 MG/DL
LYMPHOCYTES # BLD AUTO: 1.4 K/UL (ref 1–4.8)
LYMPHOCYTES NFR BLD: 24.4 % (ref 22–41)
MCH RBC QN AUTO: 32.2 PG (ref 27–33)
MCHC RBC AUTO-ENTMCNC: 34.8 G/DL (ref 32.3–36.5)
MCV RBC AUTO: 92.8 FL (ref 81.4–97.8)
MONOCYTES # BLD AUTO: 0.53 K/UL (ref 0–0.85)
MONOCYTES NFR BLD AUTO: 9.2 % (ref 0–13.4)
NEUTROPHILS # BLD AUTO: 3.53 K/UL (ref 1.82–7.42)
NEUTROPHILS NFR BLD: 61.7 % (ref 44–72)
NRBC # BLD AUTO: 0 K/UL
NRBC BLD-RTO: 0 /100 WBC (ref 0–0.2)
PLATELET # BLD AUTO: 218 K/UL (ref 164–446)
PMV BLD AUTO: 11.3 FL (ref 9–12.9)
POTASSIUM SERPL-SCNC: 5 MMOL/L (ref 3.6–5.5)
PROT SERPL-MCNC: 7.1 G/DL (ref 6–8.2)
PSA SERPL-MCNC: 0.83 NG/ML (ref 0–4)
RBC # BLD AUTO: 5.83 M/UL (ref 4.7–6.1)
SODIUM SERPL-SCNC: 140 MMOL/L (ref 135–145)
TRIGL SERPL-MCNC: 137 MG/DL (ref 0–149)
TSH SERPL-ACNC: 1.85 UIU/ML (ref 0.35–5.5)
WBC # BLD AUTO: 5.7 K/UL (ref 4.8–10.8)

## 2024-08-05 PROCEDURE — 84443 ASSAY THYROID STIM HORMONE: CPT

## 2024-08-05 PROCEDURE — 80061 LIPID PANEL: CPT

## 2024-08-05 PROCEDURE — 80053 COMPREHEN METABOLIC PANEL: CPT

## 2024-08-05 PROCEDURE — 85025 COMPLETE CBC W/AUTO DIFF WBC: CPT

## 2024-08-05 PROCEDURE — 84153 ASSAY OF PSA TOTAL: CPT

## 2024-08-05 PROCEDURE — 36415 COLL VENOUS BLD VENIPUNCTURE: CPT
